# Patient Record
Sex: FEMALE | Race: WHITE | NOT HISPANIC OR LATINO | Employment: FULL TIME | URBAN - METROPOLITAN AREA
[De-identification: names, ages, dates, MRNs, and addresses within clinical notes are randomized per-mention and may not be internally consistent; named-entity substitution may affect disease eponyms.]

---

## 2023-10-31 ENCOUNTER — PATIENT MESSAGE (OUTPATIENT)
Dept: PRIMARY CARE | Facility: CLINIC | Age: 63
End: 2023-10-31
Payer: COMMERCIAL

## 2023-10-31 DIAGNOSIS — Z00.00 ROUTINE GENERAL MEDICAL EXAMINATION AT A HEALTH CARE FACILITY: Primary | ICD-10-CM

## 2023-10-31 DIAGNOSIS — E55.9 VITAMIN D DEFICIENCY: ICD-10-CM

## 2023-12-04 ENCOUNTER — LAB (OUTPATIENT)
Dept: LAB | Facility: LAB | Age: 63
End: 2023-12-04
Payer: COMMERCIAL

## 2023-12-04 DIAGNOSIS — E55.9 VITAMIN D DEFICIENCY: ICD-10-CM

## 2023-12-04 DIAGNOSIS — Z00.00 ROUTINE GENERAL MEDICAL EXAMINATION AT A HEALTH CARE FACILITY: ICD-10-CM

## 2023-12-04 LAB
25(OH)D3 SERPL-MCNC: 35 NG/ML (ref 30–100)
ALBUMIN SERPL BCP-MCNC: 4.2 G/DL (ref 3.4–5)
ALP SERPL-CCNC: 65 U/L (ref 33–136)
ALT SERPL W P-5'-P-CCNC: 13 U/L (ref 7–45)
ANION GAP SERPL CALC-SCNC: 12 MMOL/L (ref 10–20)
AST SERPL W P-5'-P-CCNC: 15 U/L (ref 9–39)
BILIRUB SERPL-MCNC: 0.5 MG/DL (ref 0–1.2)
BUN SERPL-MCNC: 18 MG/DL (ref 6–23)
CALCIUM SERPL-MCNC: 9.6 MG/DL (ref 8.6–10.6)
CHLORIDE SERPL-SCNC: 100 MMOL/L (ref 98–107)
CHOLEST SERPL-MCNC: 208 MG/DL (ref 0–199)
CHOLESTEROL/HDL RATIO: 4.7
CO2 SERPL-SCNC: 29 MMOL/L (ref 21–32)
CREAT SERPL-MCNC: 0.86 MG/DL (ref 0.5–1.05)
ERYTHROCYTE [DISTWIDTH] IN BLOOD BY AUTOMATED COUNT: 13.4 % (ref 11.5–14.5)
EST. AVERAGE GLUCOSE BLD GHB EST-MCNC: 108 MG/DL
GFR SERPL CREATININE-BSD FRML MDRD: 76 ML/MIN/1.73M*2
GLUCOSE SERPL-MCNC: 92 MG/DL (ref 74–99)
HBA1C MFR BLD: 5.4 %
HCT VFR BLD AUTO: 40.8 % (ref 36–46)
HDLC SERPL-MCNC: 44.2 MG/DL
HGB BLD-MCNC: 13.1 G/DL (ref 12–16)
LDLC SERPL CALC-MCNC: 146 MG/DL
MCH RBC QN AUTO: 28.8 PG (ref 26–34)
MCHC RBC AUTO-ENTMCNC: 32.1 G/DL (ref 32–36)
MCV RBC AUTO: 90 FL (ref 80–100)
NON HDL CHOLESTEROL: 164 MG/DL (ref 0–149)
NRBC BLD-RTO: 0 /100 WBCS (ref 0–0)
PLATELET # BLD AUTO: 321 X10*3/UL (ref 150–450)
POTASSIUM SERPL-SCNC: 4.3 MMOL/L (ref 3.5–5.3)
PROT SERPL-MCNC: 7.1 G/DL (ref 6.4–8.2)
RBC # BLD AUTO: 4.55 X10*6/UL (ref 4–5.2)
SODIUM SERPL-SCNC: 137 MMOL/L (ref 136–145)
T4 FREE SERPL-MCNC: 1.18 NG/DL (ref 0.78–1.48)
TRIGL SERPL-MCNC: 91 MG/DL (ref 0–149)
TSH SERPL-ACNC: 4.01 MIU/L (ref 0.44–3.98)
VIT B12 SERPL-MCNC: 422 PG/ML (ref 211–911)
VLDL: 18 MG/DL (ref 0–40)
WBC # BLD AUTO: 7.3 X10*3/UL (ref 4.4–11.3)

## 2023-12-04 PROCEDURE — 84439 ASSAY OF FREE THYROXINE: CPT

## 2023-12-04 PROCEDURE — 82607 VITAMIN B-12: CPT

## 2023-12-04 PROCEDURE — 36415 COLL VENOUS BLD VENIPUNCTURE: CPT

## 2023-12-04 PROCEDURE — 83036 HEMOGLOBIN GLYCOSYLATED A1C: CPT

## 2023-12-04 PROCEDURE — 80053 COMPREHEN METABOLIC PANEL: CPT

## 2023-12-04 PROCEDURE — 84443 ASSAY THYROID STIM HORMONE: CPT

## 2023-12-04 PROCEDURE — 80061 LIPID PANEL: CPT

## 2023-12-04 PROCEDURE — 82306 VITAMIN D 25 HYDROXY: CPT

## 2023-12-04 PROCEDURE — 85027 COMPLETE CBC AUTOMATED: CPT

## 2023-12-07 NOTE — PROGRESS NOTES
"Subjective   Patient ID: Jackie Canela is a 63 y.o. female who presents for Annual Exam.    Last Annual Physical: Nov 2022  Last Dental Visit: Up-to-date   Last Eye exam: Scheduled   Hearing Concerns: No   Diet: Well- balanced   Exercise Routine: Regular walking       HPI   The patient reports that she is taking atenolol-chlorthalidone for her hypertension as prescribed and her blood pressure is well- controlled at this time. She has no side effects from this medication. Blood work showed that the patient's LDL is elevated and she complains of urinary frequency.    Review of Systems  Constitutional: No fever or chills, No Night Sweats  Eyes: No Blurry Vision or Eye sight problems  ENT: No Nasal Discharge, Hoarseness, sore throat  Cardiovascular: no chest pain, no palpitations and no syncope.   Respiratory: no cough, no shortness of breath during exertion and no shortness of breath at rest.   Gastrointestinal: no abdominal pain, no nausea and no vomiting.   : No vaginal discharge, burning with urination, no blood in urine or stools  Skin: No Skin rashes or Lesions  Neuro: No Headache, no dizziness or Numbness or tingling  Psych: No Anxiety, depression or sleeping problems  Heme: No Easy bleeding or brusing.     Objective   /74   Pulse 64   Ht 1.676 m (5' 6\")   Wt 94.3 kg (208 lb)   SpO2 94%   BMI 33.57 kg/m²     Physical Exam  Constitutional: Alert and in no acute distress. Well developed, well nourished.   Head and Face: Head and face: Normal.    Eyes: Normal external exam. Pupils were equal in size, round, reactive to light (PERRL) with normal accommodation and extraocular movements intact (EOMI).   Ears, Nose, Mouth, and Throat: External inspection of ears and nose: Normal.  Hearing: Normal.  Nasal mucosa, septum, and turbinates: Normal.  Lips, teeth, and gums: Normal.  Oropharynx: Normal.   Neck: No neck mass was observed. Supple. Thyroid not enlarged and there were no palpable thyroid nodules. "   Cardiovascular: Heart rate and rhythm were normal, normal S1 and S2. Pedal pulses: Normal. No peripheral edema.   Pulmonary: No respiratory distress. Clear bilateral breath sounds.   Abdomen: Soft nontender; no abdominal mass palpated. Normal bowel sounds. No organomegaly.   Musculoskeletal: No joint swelling seen, normal movements of all extremities. Range of motion: Normal.  Muscle strength/tone: Normal.    Skin: Normal skin color and pigmentation, normal skin turgor, and no rash.   Neurologic: Deep tendon reflexes were 2+ and symmetric.   Psychiatric: Judgment and insight: Intact. Mood and affect: Normal.  Lymphatic: No cervical lymphadenopathy. Palpation of lymph nodes in axillae: Normal.  Palpation of lymph nodes in groin: Normal.    Lab Results   Component Value Date    WBC 7.3 12/04/2023    HGB 13.1 12/04/2023    HCT 40.8 12/04/2023     12/04/2023    CHOL 208 (H) 12/04/2023    TRIG 91 12/04/2023    HDL 44.2 12/04/2023    ALT 13 12/04/2023    AST 15 12/04/2023     12/04/2023    K 4.3 12/04/2023     12/04/2023    CREATININE 0.86 12/04/2023    BUN 18 12/04/2023    CO2 29 12/04/2023    TSH 4.01 (H) 12/04/2023    GLUF 92 07/16/2018    HGBA1C 5.4 12/04/2023       BI mammo bilateral screening tomosynthesis  Narrative: Interpreted By:  BRYAN GONZALES MD  MRN: [id number]  Patient Name: KIM HOWARD     STUDY:  DIGITAL MAMM SCREENING W/ PERNELL;  6/12/2023 7:52 am     ACCESSION NUMBER(S):  [id number]     ORDERING CLINICIAN:  NAHOMI MCDONALD     INDICATION:  Screening. Benign right core biopsy. Strong family history of breast  cancer.     COMPARISON:  06/03/2022, 05/24/2021     FINDINGS:  2D and tomosynthesis images were reviewed at 1 mm slice thickness.     There are areas of scattered fibroglandular tissue.  There are  bilateral benign masses. No suspicious masses or calcifications are  identified.     Impression: No mammographic evidence of malignancy.     BI-RADS CATEGORY:     Category: 2 -  Benign.  Recommendation: 1 Year Screening.     For any future breast imaging appointments, please call [phone]  [phone]).     Patient letter sent SNORM            Assessment/Plan   Diagnoses and all orders for this visit:  Annual physical exam  Primary hypertension  -     atenoloL-chlorthalidone (Tenoretic) 50-25 mg tablet; Take 0.5 tablets by mouth once daily.  Visit for screening mammogram  -     BI mammo bilateral screening tomosynthesis; Future  Moderate mixed hyperlipidemia not requiring statin therapy  -     Lipoprotein a; Future  -     Lipid Panel; Future  Abnormal thyroid exam  -     TSH with reflex to Free T4 if abnormal; Future  Class 1 obesity due to excess calories with serious comorbidity and body mass index (BMI) of 33.0 to 33.9 in adult        Dear Jackie Canela     It was my pleasure to take care of you today in the office. Below are the things we discussed today:    1. 1. Immunizations: Yearly Flu shot is recommended. Up-to-date          a: COVID: Booster up-to-Date         b: Tetanus: Up-to-date         c: Shingrix: Up-to-date        D. Prevnar: Due 2024    2. Blood Work: Reviewed  3. Seen your dentist twice a year  4. Yearly Eye exam is recommended    5. BMI: Obese   6: Diet recommendations:   Eat Clean, Try to have as many home cooked meals as possible  Avoid processed foods which contain excess calories, sugar, and sodium.    7. Exercise recommendations:   150 minutes a week to maintain your weight     If you have to loose weight, you need a better diet and exercise plan.     8. Supplements recommended:  a - Calcium 600 mg up to twice a day to get a total of 1200 mg. Each 8 oz of milk or yogurt or 1 oz of cheese, 1 Banana, 1 serving of green Leafy vegetable has about 300 mg of Calcium, so you may subtract that amount. Calcium citrate is the only acceptable supplement to take if you take an acid suppressing medication like Prilosec; otherwise Calcium carbonate is acceptable too (It can cause  Constipation).   b - Vitamin D - 2000 IU daily     9. Please get your Living will / Advance directive completed if you do not have one already. Please make sure our office has a copy of the latest one.     10. Colonoscopy: Uptodate. Last done in Nov 2019, repeat in Nov 2029  11. Mammogram: Uptodate, ordered for June 2024   12. PAP: Not indicated. S/P hysterectomy   13. Skin Check: Please see Dermatology once a year for a Skin Check.     15. Hypertension: Well- controlled. Continue atenolol- chlorthalidone.    16. Hyperlipidemia: LDL showed 146. Advised the patient to consume a more plant-based diet and limit animal product intake. Lipoprotein A ordered. If lipoprotein A is elevated she will start medication.    17. Abnormal TSH: The patient will recheck levels in 2 months.    18. Urinary frequency: Advised the patient to empty her bladder frequently and try exercises. The patient will let me know if she would like to start medication.    Follow up in one year for a Physical. Please call the office before your Physical to see if you need blood work completed prior to your physical.     Please call me if any questions arise from now until your next visit. I will call you after I am done seeing patients. A Doctor is always available by phone when the office is closed. Please feel free to call for help with any problem that you feel shouldn't wait until the office re-opens.     Scribe Attestation  By signing my name below, IMeenakshi Scribe   attest that this documentation has been prepared under the direction and in the presence of Ana George MD.

## 2023-12-08 ENCOUNTER — OFFICE VISIT (OUTPATIENT)
Dept: PRIMARY CARE | Facility: CLINIC | Age: 63
End: 2023-12-08
Payer: COMMERCIAL

## 2023-12-08 VITALS
WEIGHT: 208 LBS | BODY MASS INDEX: 33.43 KG/M2 | OXYGEN SATURATION: 94 % | HEART RATE: 64 BPM | DIASTOLIC BLOOD PRESSURE: 74 MMHG | SYSTOLIC BLOOD PRESSURE: 120 MMHG | HEIGHT: 66 IN

## 2023-12-08 DIAGNOSIS — E78.2 MODERATE MIXED HYPERLIPIDEMIA NOT REQUIRING STATIN THERAPY: ICD-10-CM

## 2023-12-08 DIAGNOSIS — R94.6 ABNORMAL THYROID EXAM: ICD-10-CM

## 2023-12-08 DIAGNOSIS — E66.09 CLASS 1 OBESITY DUE TO EXCESS CALORIES WITH SERIOUS COMORBIDITY AND BODY MASS INDEX (BMI) OF 33.0 TO 33.9 IN ADULT: ICD-10-CM

## 2023-12-08 DIAGNOSIS — Z12.31 VISIT FOR SCREENING MAMMOGRAM: ICD-10-CM

## 2023-12-08 DIAGNOSIS — I10 PRIMARY HYPERTENSION: ICD-10-CM

## 2023-12-08 DIAGNOSIS — Z00.00 ANNUAL PHYSICAL EXAM: Primary | ICD-10-CM

## 2023-12-08 PROBLEM — L82.1 OTHER SEBORRHEIC KERATOSIS: Status: ACTIVE | Noted: 2023-03-07

## 2023-12-08 PROBLEM — B00.1 RECURRENT COLD SORES: Status: ACTIVE | Noted: 2023-12-08

## 2023-12-08 PROBLEM — M92.61 HAGLUND'S DEFORMITY, RIGHT: Status: ACTIVE | Noted: 2023-12-08

## 2023-12-08 PROBLEM — M17.9 KNEE OSTEOARTHRITIS: Status: ACTIVE | Noted: 2023-12-08

## 2023-12-08 PROBLEM — M76.61 ACHILLES TENDINITIS OF RIGHT LOWER EXTREMITY: Status: ACTIVE | Noted: 2023-12-08

## 2023-12-08 PROBLEM — E04.2 NONTOXIC MULTINODULAR GOITER: Status: ACTIVE | Noted: 2023-12-08

## 2023-12-08 PROBLEM — J30.9 ALLERGIC RHINITIS: Status: ACTIVE | Noted: 2023-12-08

## 2023-12-08 PROCEDURE — 3074F SYST BP LT 130 MM HG: CPT | Performed by: FAMILY MEDICINE

## 2023-12-08 PROCEDURE — 3078F DIAST BP <80 MM HG: CPT | Performed by: FAMILY MEDICINE

## 2023-12-08 PROCEDURE — 1036F TOBACCO NON-USER: CPT | Performed by: FAMILY MEDICINE

## 2023-12-08 PROCEDURE — 3008F BODY MASS INDEX DOCD: CPT | Performed by: FAMILY MEDICINE

## 2023-12-08 PROCEDURE — 99396 PREV VISIT EST AGE 40-64: CPT | Performed by: FAMILY MEDICINE

## 2023-12-08 RX ORDER — IRON BIS-GLYCINAT/VIT C/FA/B12 28-60-0.4
1 CAPSULE ORAL 2 TIMES WEEKLY
COMMUNITY
Start: 2012-07-13

## 2023-12-08 RX ORDER — VIT C/E/ZN/COPPR/LUTEIN/ZEAXAN 250MG-90MG
25 CAPSULE ORAL 2 TIMES WEEKLY
COMMUNITY
Start: 2012-07-13

## 2023-12-08 RX ORDER — ATENOLOL AND CHLORTHALIDONE TABLET 50; 25 MG/1; MG/1
0.5 TABLET ORAL DAILY
COMMUNITY
End: 2023-12-08 | Stop reason: SDUPTHER

## 2023-12-08 RX ORDER — ATENOLOL AND CHLORTHALIDONE TABLET 50; 25 MG/1; MG/1
0.5 TABLET ORAL DAILY
Qty: 45 TABLET | Refills: 3 | Status: SHIPPED | OUTPATIENT
Start: 2023-12-08

## 2023-12-08 ASSESSMENT — PATIENT HEALTH QUESTIONNAIRE - PHQ9
SUM OF ALL RESPONSES TO PHQ9 QUESTIONS 1 AND 2: 0
1. LITTLE INTEREST OR PLEASURE IN DOING THINGS: NOT AT ALL
2. FEELING DOWN, DEPRESSED OR HOPELESS: NOT AT ALL

## 2023-12-08 ASSESSMENT — COLUMBIA-SUICIDE SEVERITY RATING SCALE - C-SSRS
1. IN THE PAST MONTH, HAVE YOU WISHED YOU WERE DEAD OR WISHED YOU COULD GO TO SLEEP AND NOT WAKE UP?: NO
2. HAVE YOU ACTUALLY HAD ANY THOUGHTS OF KILLING YOURSELF?: NO

## 2024-01-05 ENCOUNTER — OFFICE VISIT (OUTPATIENT)
Dept: OPHTHALMOLOGY | Facility: CLINIC | Age: 64
End: 2024-01-05
Payer: COMMERCIAL

## 2024-01-05 DIAGNOSIS — H52.4 PRESBYOPIA: ICD-10-CM

## 2024-01-05 DIAGNOSIS — H35.722 RETINAL PIGMENT EPITHELIAL DETACHMENT OF LEFT EYE: ICD-10-CM

## 2024-01-05 DIAGNOSIS — H35.3121 EARLY DRY STAGE NONEXUDATIVE AGE-RELATED MACULAR DEGENERATION OF LEFT EYE: Primary | ICD-10-CM

## 2024-01-05 DIAGNOSIS — H52.13 MYOPIA, BILATERAL: ICD-10-CM

## 2024-01-05 PROCEDURE — 92134 CPTRZ OPH DX IMG PST SGM RTA: CPT | Mod: BILATERAL PROCEDURE | Performed by: OPTOMETRIST

## 2024-01-05 PROCEDURE — 92014 COMPRE OPH EXAM EST PT 1/>: CPT | Performed by: OPTOMETRIST

## 2024-01-05 PROCEDURE — 92015 DETERMINE REFRACTIVE STATE: CPT | Performed by: OPTOMETRIST

## 2024-01-05 ASSESSMENT — ENCOUNTER SYMPTOMS
EYES NEGATIVE: 0
CONSTITUTIONAL NEGATIVE: 0
RESPIRATORY NEGATIVE: 0
PSYCHIATRIC NEGATIVE: 0
CARDIOVASCULAR NEGATIVE: 0
ENDOCRINE NEGATIVE: 0
ALLERGIC/IMMUNOLOGIC NEGATIVE: 0
NEUROLOGICAL NEGATIVE: 0
GASTROINTESTINAL NEGATIVE: 0
MUSCULOSKELETAL NEGATIVE: 0
HEMATOLOGIC/LYMPHATIC NEGATIVE: 0

## 2024-01-05 ASSESSMENT — REFRACTION_MANIFEST
OD_ADD: +2.50
OD_SPHERE: -2.00
OD_AXIS: 003
OD_CYLINDER: -2.00
OS_AXIS: 023
OD_AXIS: 003
OS_SPHERE: -1.50
OD_CYLINDER: -1.75
OS_SPHERE: -1.50
OD_SPHERE: -2.25
OS_ADD: +2.50
OS_AXIS: 023
OS_CYLINDER: -0.25
OS_CYLINDER: -1.00
METHOD_AUTOREFRACTION: 1

## 2024-01-05 ASSESSMENT — REFRACTION_CURRENTRX
OD_BRAND: BIOFINITY MF TORIC
OS_SPHERE: -1.50
OD_BASECURVE: 8.7
OS_ADD: +2.50D
OS_AXIS: 020
OS_DIAMETER: 14.5
OD_AXIS: 180
OD_DIAMETER: 14.5
OD_ADD: +2.50N
OS_BASECURVE: 8.7
OS_BRAND: BIOFINITY MF TORIC
OS_CYLINDER: -0.75
OD_SPHERE: -2.25
OD_CYLINDER: -1.75

## 2024-01-05 ASSESSMENT — REFRACTION_WEARINGRX
OS_ADD: +2.50
OD_CYLINDER: -2.25
OS_CYLINDER: -1.00
OS_AXIS: 015
OD_AXIS: 010
OD_ADD: +2.50
OD_SPHERE: -2.25
OS_SPHERE: -1.50

## 2024-01-05 ASSESSMENT — CONF VISUAL FIELD
OD_SUPERIOR_TEMPORAL_RESTRICTION: 0
OS_SUPERIOR_NASAL_RESTRICTION: 0
OD_INFERIOR_NASAL_RESTRICTION: 0
OD_SUPERIOR_NASAL_RESTRICTION: 0
OS_INFERIOR_TEMPORAL_RESTRICTION: 0
OD_INFERIOR_TEMPORAL_RESTRICTION: 0
OS_SUPERIOR_TEMPORAL_RESTRICTION: 0
OS_NORMAL: 1
OD_NORMAL: 1
OS_INFERIOR_NASAL_RESTRICTION: 0

## 2024-01-05 ASSESSMENT — VISUAL ACUITY
OD_CC: 20/20
OS_CC: 20/20
CORRECTION_TYPE: GLASSES
METHOD: SNELLEN - LINEAR

## 2024-01-05 ASSESSMENT — SLIT LAMP EXAM - LIDS
COMMENTS: GOOD POSITION
COMMENTS: GOOD POSITION

## 2024-01-05 ASSESSMENT — TONOMETRY
IOP_METHOD: TONOPEN
OS_IOP_MMHG: 17
OD_IOP_MMHG: 14

## 2024-01-05 ASSESSMENT — CUP TO DISC RATIO
OS_RATIO: .2
OD_RATIO: .2

## 2024-01-05 ASSESSMENT — EXTERNAL EXAM - RIGHT EYE: OD_EXAM: NORMAL

## 2024-01-05 ASSESSMENT — EXTERNAL EXAM - LEFT EYE: OS_EXAM: NORMAL

## 2024-01-05 NOTE — Clinical Note
Both eyes (OU) Contact Lens Order  Quantity: 1 Package: TRIAL Appointment needed? Yes Medically necessary? No Ship To: [facility] Additional instructions: order trial Biofinity MF toric Both eyes, return when in

## 2024-01-06 NOTE — PROGRESS NOTES
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     Myopia, bilateral  Presbyopia  Interested in trying CL again, would like multifocal, has not tried Biofinity Toric MF, order trials and return when in    A spectacle prescription was dispensed to be used as needed.

## 2024-02-02 ENCOUNTER — OFFICE VISIT (OUTPATIENT)
Dept: OPHTHALMOLOGY | Facility: CLINIC | Age: 64
End: 2024-02-02
Payer: COMMERCIAL

## 2024-02-02 DIAGNOSIS — H52.4 PRESBYOPIA: ICD-10-CM

## 2024-02-02 DIAGNOSIS — H52.13 MYOPIA, BILATERAL: Primary | ICD-10-CM

## 2024-02-02 PROCEDURE — FLVLG CONTACT LENS EVAL - LEVEL 2 (SP): Performed by: OPTOMETRIST

## 2024-02-02 ASSESSMENT — ENCOUNTER SYMPTOMS
RESPIRATORY NEGATIVE: 0
PSYCHIATRIC NEGATIVE: 0
CARDIOVASCULAR NEGATIVE: 0
NEUROLOGICAL NEGATIVE: 0
ENDOCRINE NEGATIVE: 0
MUSCULOSKELETAL NEGATIVE: 0
CONSTITUTIONAL NEGATIVE: 0
ALLERGIC/IMMUNOLOGIC NEGATIVE: 0
HEMATOLOGIC/LYMPHATIC NEGATIVE: 0
EYES NEGATIVE: 0
GASTROINTESTINAL NEGATIVE: 0

## 2024-02-02 ASSESSMENT — VISUAL ACUITY
OS_CC+: -1
OD_CC: 20/20
VA_OR_OS_CURRENT_RX: 20/20-2
VA_OR_OD_CURRENT_RX: 20/20
METHOD: SNELLEN - LINEAR
OS_CC: 20/20
CORRECTION_TYPE: GLASSES

## 2024-02-02 ASSESSMENT — REFRACTION_CURRENTRX
OD_BASECURVE: 8.7
OD_SPHERE: -2.25
OS_ADD: +2.50D
OD_DIAMETER: 14.5
OS_CYLINDER: -0.75
OS_SPHERE: -1.50
OS_DIAMETER: 14.5
OD_ADD: +2.50N
OD_CYLINDER: -1.75
OS_BASECURVE: 8.7
OS_BRAND: BIOFINITY MF TORIC
OD_BRAND: BIOFINITY MF TORIC
OD_AXIS: 180
OS_AXIS: 020

## 2024-02-02 ASSESSMENT — EXTERNAL EXAM - RIGHT EYE: OD_EXAM: NORMAL

## 2024-02-02 ASSESSMENT — SLIT LAMP EXAM - LIDS
COMMENTS: GOOD POSITION
COMMENTS: GOOD POSITION

## 2024-02-02 ASSESSMENT — EXTERNAL EXAM - LEFT EYE: OS_EXAM: NORMAL

## 2024-02-02 NOTE — PROGRESS NOTES
Assessment/Plan   Diagnoses and all orders for this visit:  Myopia, bilateral  Presbyopia    CL dispensed  Cleaning reviewed  Soft contact lens (SCL) HO given    Modified mono Right eye near    Follow up 3 weeks

## 2024-02-08 ENCOUNTER — OFFICE VISIT (OUTPATIENT)
Dept: OPHTHALMOLOGY | Facility: CLINIC | Age: 64
End: 2024-02-08
Payer: COMMERCIAL

## 2024-02-08 DIAGNOSIS — H35.722 RETINAL PIGMENT EPITHELIAL DETACHMENT OF LEFT EYE: ICD-10-CM

## 2024-02-08 DIAGNOSIS — H35.3132 INTERMEDIATE STAGE NONEXUDATIVE AGE-RELATED MACULAR DEGENERATION OF BOTH EYES: Primary | ICD-10-CM

## 2024-02-08 PROCEDURE — 99214 OFFICE O/P EST MOD 30 MIN: CPT

## 2024-02-08 PROCEDURE — 92134 CPTRZ OPH DX IMG PST SGM RTA: CPT | Mod: BILATERAL PROCEDURE

## 2024-02-08 ASSESSMENT — CONF VISUAL FIELD
OS_NORMAL: 1
OD_INFERIOR_NASAL_RESTRICTION: 0
OD_NORMAL: 1
OD_SUPERIOR_TEMPORAL_RESTRICTION: 0
OD_INFERIOR_TEMPORAL_RESTRICTION: 0
OS_INFERIOR_TEMPORAL_RESTRICTION: 0
OD_SUPERIOR_NASAL_RESTRICTION: 0
OS_SUPERIOR_NASAL_RESTRICTION: 0
OS_SUPERIOR_TEMPORAL_RESTRICTION: 0
OS_INFERIOR_NASAL_RESTRICTION: 0

## 2024-02-08 ASSESSMENT — VISUAL ACUITY
OD_CC: 20/20
METHOD: SNELLEN - LINEAR
CORRECTION_TYPE: GLASSES
OS_CC: 20/20

## 2024-02-08 ASSESSMENT — SLIT LAMP EXAM - LIDS
COMMENTS: GOOD POSITION
COMMENTS: GOOD POSITION

## 2024-02-08 ASSESSMENT — CUP TO DISC RATIO
OS_RATIO: 0.3
OD_RATIO: 0.3

## 2024-02-08 ASSESSMENT — TONOMETRY
OS_IOP_MMHG: 15
OD_IOP_MMHG: 12
IOP_METHOD: GOLDMANN APPLANATION

## 2024-02-08 ASSESSMENT — EXTERNAL EXAM - RIGHT EYE: OD_EXAM: NORMAL

## 2024-02-08 ASSESSMENT — EXTERNAL EXAM - LEFT EYE: OS_EXAM: NORMAL

## 2024-02-08 NOTE — PROGRESS NOTES
Intermediate stage dry age-related macular degeneration of both eyesH35.3123  New patient referred by Dr. Patel  - evidence of large/ soft drusen OU  - no evidence of CNVM or fluid on exam or OCT  - exam is consistent with intermediate dry    - OCT 02/08/24 : multiple medium to large drusen, no IRF or SRF, both eyes (OU)   - - -   #Plan#:   1. AREDS 2 BID    2. Recommend UV protection, diet modification (eat green leafy vegetables, fish, lower meat consumption)  3. Amsler grid follow up  4. Observe  5. RTC in 6 months       Myopia, bilateral  Presbyopia   - managed by Dr. Patel

## 2024-03-01 ENCOUNTER — OFFICE VISIT (OUTPATIENT)
Dept: OPHTHALMOLOGY | Facility: CLINIC | Age: 64
End: 2024-03-01
Payer: COMMERCIAL

## 2024-03-01 DIAGNOSIS — H52.13 MYOPIA, BILATERAL: Primary | ICD-10-CM

## 2024-03-01 DIAGNOSIS — H52.4 PRESBYOPIA: ICD-10-CM

## 2024-03-01 ASSESSMENT — CONF VISUAL FIELD
OD_SUPERIOR_TEMPORAL_RESTRICTION: 0
OD_NORMAL: 1
OS_INFERIOR_NASAL_RESTRICTION: 0
OD_INFERIOR_NASAL_RESTRICTION: 0
OD_INFERIOR_TEMPORAL_RESTRICTION: 0
METHOD: COUNTING FINGERS
OS_NORMAL: 1
OS_SUPERIOR_TEMPORAL_RESTRICTION: 0
OS_SUPERIOR_NASAL_RESTRICTION: 0
OD_SUPERIOR_NASAL_RESTRICTION: 0
OS_INFERIOR_TEMPORAL_RESTRICTION: 0

## 2024-03-01 ASSESSMENT — REFRACTION_CURRENTRX
OS_ADD: +2.50D
OS_BRAND: BIOFINITY MF TORIC
OD_AXIS: 180
OD_ADD: +2.50N
OD_SPHERE: -1.75
OD_SPHERE: -2.25
OD_CYLINDER: -1.75
OD_ADD: +2.50N
OS_AXIS: 020
OD_CYLINDER: -1.75
OD_BRAND: BIOFINITY MF TORIC
OS_AXIS: 020
OS_BASECURVE: 8.7
OD_AXIS: 180
OD_BASECURVE: 8.7
OS_SPHERE: -1.50
OD_DIAMETER: 14.5
OS_ADD: +2.50D
OD_BRAND: BIOFINITY MF TORIC
OS_DIAMETER: 14.5
OS_BASECURVE: 8.7
OS_DIAMETER: 14.5
OD_DIAMETER: 14.5
OS_CYLINDER: -0.75
OD_BASECURVE: 8.7
OS_CYLINDER: -0.75
OS_BRAND: BIOFINITY MF TORIC
OS_SPHERE: -1.50

## 2024-03-01 ASSESSMENT — ENCOUNTER SYMPTOMS
CONSTITUTIONAL NEGATIVE: 0
RESPIRATORY NEGATIVE: 0
PSYCHIATRIC NEGATIVE: 0
MUSCULOSKELETAL NEGATIVE: 0
ALLERGIC/IMMUNOLOGIC NEGATIVE: 0
GASTROINTESTINAL NEGATIVE: 0
CARDIOVASCULAR NEGATIVE: 0
ENDOCRINE NEGATIVE: 0
EYES NEGATIVE: 1
NEUROLOGICAL NEGATIVE: 0
HEMATOLOGIC/LYMPHATIC NEGATIVE: 0

## 2024-03-01 ASSESSMENT — VISUAL ACUITY
OS_CC: 20/20
OD_CC: 20/40
METHOD: SNELLEN - LINEAR

## 2024-03-01 ASSESSMENT — SLIT LAMP EXAM - LIDS
COMMENTS: GOOD POSITION
COMMENTS: GOOD POSITION

## 2024-03-01 ASSESSMENT — EXTERNAL EXAM - LEFT EYE: OS_EXAM: NORMAL

## 2024-03-01 ASSESSMENT — EXTERNAL EXAM - RIGHT EYE: OD_EXAM: NORMAL

## 2024-03-01 NOTE — Clinical Note
Both eyes (OU) Contact Lens Order  Quantity: 1 Package: TRIAL Appointment needed? No Medically necessary? No Ship To: Home Additional instructions: second trial pair Mail to patient

## 2024-03-04 ENCOUNTER — LAB (OUTPATIENT)
Dept: LAB | Facility: LAB | Age: 64
End: 2024-03-04
Payer: COMMERCIAL

## 2024-03-04 DIAGNOSIS — E78.2 MODERATE MIXED HYPERLIPIDEMIA NOT REQUIRING STATIN THERAPY: ICD-10-CM

## 2024-03-04 DIAGNOSIS — R94.6 ABNORMAL THYROID EXAM: ICD-10-CM

## 2024-03-04 LAB
CHOLEST SERPL-MCNC: 191 MG/DL (ref 0–199)
CHOLESTEROL/HDL RATIO: 4.8
HDLC SERPL-MCNC: 39.9 MG/DL
LDLC SERPL CALC-MCNC: 131 MG/DL
NON HDL CHOLESTEROL: 151 MG/DL (ref 0–149)
TRIGL SERPL-MCNC: 99 MG/DL (ref 0–149)
TSH SERPL-ACNC: 3.39 MIU/L (ref 0.44–3.98)
VLDL: 20 MG/DL (ref 0–40)

## 2024-03-04 PROCEDURE — 80061 LIPID PANEL: CPT

## 2024-03-04 PROCEDURE — 84443 ASSAY THYROID STIM HORMONE: CPT

## 2024-03-04 PROCEDURE — 82172 ASSAY OF APOLIPOPROTEIN: CPT

## 2024-03-04 PROCEDURE — 36415 COLL VENOUS BLD VENIPUNCTURE: CPT

## 2024-03-06 LAB — LPA SERPL-MCNC: 7 MG/DL

## 2024-03-21 ENCOUNTER — TELEPHONE (OUTPATIENT)
Dept: OPHTHALMOLOGY | Facility: CLINIC | Age: 64
End: 2024-03-21
Payer: COMMERCIAL

## 2024-06-12 ENCOUNTER — APPOINTMENT (OUTPATIENT)
Dept: RADIOLOGY | Facility: CLINIC | Age: 64
End: 2024-06-12
Payer: COMMERCIAL

## 2024-06-17 ENCOUNTER — APPOINTMENT (OUTPATIENT)
Dept: RADIOLOGY | Facility: CLINIC | Age: 64
End: 2024-06-17
Payer: COMMERCIAL

## 2024-06-17 ENCOUNTER — HOSPITAL ENCOUNTER (OUTPATIENT)
Dept: RADIOLOGY | Facility: CLINIC | Age: 64
Discharge: HOME | End: 2024-06-17
Payer: COMMERCIAL

## 2024-06-17 VITALS — HEIGHT: 66 IN | BODY MASS INDEX: 32.62 KG/M2 | WEIGHT: 203 LBS

## 2024-06-17 DIAGNOSIS — Z12.31 VISIT FOR SCREENING MAMMOGRAM: ICD-10-CM

## 2024-06-17 PROCEDURE — 77067 SCR MAMMO BI INCL CAD: CPT

## 2024-06-17 PROCEDURE — 77067 SCR MAMMO BI INCL CAD: CPT | Performed by: RADIOLOGY

## 2024-06-17 PROCEDURE — 77063 BREAST TOMOSYNTHESIS BI: CPT | Performed by: RADIOLOGY

## 2024-06-28 ENCOUNTER — APPOINTMENT (OUTPATIENT)
Dept: OPHTHALMOLOGY | Facility: CLINIC | Age: 64
End: 2024-06-28
Payer: COMMERCIAL

## 2024-06-28 DIAGNOSIS — H35.3121 EARLY DRY STAGE NONEXUDATIVE AGE-RELATED MACULAR DEGENERATION OF LEFT EYE: Primary | ICD-10-CM

## 2024-06-28 DIAGNOSIS — H43.811 PVD (POSTERIOR VITREOUS DETACHMENT), RIGHT EYE: ICD-10-CM

## 2024-06-28 PROCEDURE — 99213 OFFICE O/P EST LOW 20 MIN: CPT

## 2024-06-28 PROCEDURE — 92134 CPTRZ OPH DX IMG PST SGM RTA: CPT

## 2024-06-28 ASSESSMENT — SLIT LAMP EXAM - LIDS
COMMENTS: GOOD POSITION
COMMENTS: GOOD POSITION

## 2024-06-28 ASSESSMENT — ENCOUNTER SYMPTOMS
HEMATOLOGIC/LYMPHATIC NEGATIVE: 0
ENDOCRINE NEGATIVE: 0
NEUROLOGICAL NEGATIVE: 0
MUSCULOSKELETAL NEGATIVE: 0
ALLERGIC/IMMUNOLOGIC NEGATIVE: 0
CONSTITUTIONAL NEGATIVE: 0
RESPIRATORY NEGATIVE: 0
PSYCHIATRIC NEGATIVE: 0
EYES NEGATIVE: 1
CARDIOVASCULAR NEGATIVE: 0
GASTROINTESTINAL NEGATIVE: 0

## 2024-06-28 ASSESSMENT — CONF VISUAL FIELD
OS_SUPERIOR_TEMPORAL_RESTRICTION: 0
OS_NORMAL: 1
OD_SUPERIOR_TEMPORAL_RESTRICTION: 0
OD_INFERIOR_NASAL_RESTRICTION: 0
OS_INFERIOR_NASAL_RESTRICTION: 0
OD_INFERIOR_TEMPORAL_RESTRICTION: 0
OD_SUPERIOR_NASAL_RESTRICTION: 0
OD_NORMAL: 1
OS_INFERIOR_TEMPORAL_RESTRICTION: 0
OS_SUPERIOR_NASAL_RESTRICTION: 0

## 2024-06-28 ASSESSMENT — CUP TO DISC RATIO
OD_RATIO: 0.3
OS_RATIO: 0.3

## 2024-06-28 ASSESSMENT — EXTERNAL EXAM - LEFT EYE: OS_EXAM: NORMAL

## 2024-06-28 ASSESSMENT — TONOMETRY
OS_IOP_MMHG: 15
OD_IOP_MMHG: 14
IOP_METHOD: GOLDMANN APPLANATION

## 2024-06-28 ASSESSMENT — VISUAL ACUITY
OS_CC: 20/20
OD_CC: 20/20
METHOD: SNELLEN - LINEAR

## 2024-06-28 ASSESSMENT — EXTERNAL EXAM - RIGHT EYE: OD_EXAM: NORMAL

## 2024-06-28 NOTE — PROGRESS NOTES
Intermediate stage dry age-related macular degeneration of both eyesH35.3123  New patient referred by Dr. Patel  - evidence of large/ soft drusen OU  - no evidence of CNVM or fluid on exam or OCT  - exam is consistent with intermediate dry    - OCT 06/28/24 : multiple medium to large drusen, no IRF or SRF, both eyes (OU)   - - -   #Plan#:   1. AREDS 2 BID    2. Recommend UV protection, diet modification (eat green leafy vegetables, fish, lower meat consumption)  3. Amsler grid follow up  4. Observe  5. RTC in 6 months     Posterior vitreous detachment (PVD), right eyeH43.811  no retinal breaks, holes or retinal detachment    Plan  Discussed symptoms of retinal breaks and detachment.   Patient educated that retinal detachment can lead to permanent vision loss. Advised to call immediately if the patient experience any of these symptoms.  Observe  RTC in 6 weeks     Myopia, bilateral  Presbyopia   - managed by Dr. Patel

## 2024-07-24 ENCOUNTER — TELEMEDICINE (OUTPATIENT)
Dept: PRIMARY CARE | Facility: CLINIC | Age: 64
End: 2024-07-24
Payer: COMMERCIAL

## 2024-07-24 ENCOUNTER — TELEPHONE (OUTPATIENT)
Dept: PRIMARY CARE | Facility: CLINIC | Age: 64
End: 2024-07-24

## 2024-07-24 DIAGNOSIS — J01.00 ACUTE NON-RECURRENT MAXILLARY SINUSITIS: Primary | ICD-10-CM

## 2024-07-24 DIAGNOSIS — R06.2 WHEEZING: Primary | ICD-10-CM

## 2024-07-24 PROCEDURE — 99213 OFFICE O/P EST LOW 20 MIN: CPT | Performed by: FAMILY MEDICINE

## 2024-07-24 PROCEDURE — 1036F TOBACCO NON-USER: CPT | Performed by: FAMILY MEDICINE

## 2024-07-24 RX ORDER — METHYLPREDNISOLONE 4 MG/1
TABLET ORAL
Qty: 21 TABLET | Refills: 0 | Status: SHIPPED | OUTPATIENT
Start: 2024-07-24 | End: 2024-07-31

## 2024-07-24 RX ORDER — ALBUTEROL SULFATE 90 UG/1
2 AEROSOL, METERED RESPIRATORY (INHALATION) EVERY 4 HOURS PRN
Qty: 8.5 G | Refills: 0 | Status: SHIPPED | OUTPATIENT
Start: 2024-07-24

## 2024-07-24 NOTE — PROGRESS NOTES
Subjective   Patient ID: Jackie Canela is a 63 y.o. female who presents for URI.    HPI   The patient states that her symptoms started 4 days ago with wheezing at night. She denies having any fever or shortness-of-breath. She has a cough which started 4 days ago and has been worsening. It started as a dry cough and is worse in the evening, she is coughing up a yellow coloured mucous. The patient denies having any history of asthma.    Review of Systems:  Constitutional: No fever or chills  Cardiovascular: no chest pain, no palpitations and no syncope.   Respiratory: + cough and wheezing. no shortness of breath during exertion and no shortness of breath at rest.   Gastrointestinal: no abdominal pain, no nausea and no vomiting.  Neuro: No Headache, no dizziness    Physical Exam:   Constitutional: Alert and in no acute distress. Well developed, well nourished.   Head and Face: Head and face: Normal.     Eyes: Normal external exam.    Ears, Nose, Mouth, and Throat: External inspection of ears and nose: Normal.  Hearing: Normal.   Neck: No neck mass was observed. Supple.  Pulmonary: No respiratory distress.    Musculoskeletal: Range of motion: Normal.     Skin: Normal skin color and pigmentation, normal skin turgor, and no rash.    Neurologic: Coordination: Normal.    Psychiatric: Judgment and insight: Intact. Mood and affect: Normal.    Lab Results   Component Value Date    WBC 7.3 12/04/2023    HGB 13.1 12/04/2023    HCT 40.8 12/04/2023     12/04/2023    CHOL 191 03/04/2024    TRIG 99 03/04/2024    HDL 39.9 03/04/2024    ALT 13 12/04/2023    AST 15 12/04/2023     12/04/2023    K 4.3 12/04/2023     12/04/2023    CREATININE 0.86 12/04/2023    BUN 18 12/04/2023    CO2 29 12/04/2023    TSH 3.39 03/04/2024    GLUF 92 07/16/2018    HGBA1C 5.4 12/04/2023       OCT, Retina - OU - Both Eyes  Retinal multimodal imaging including photography was completed, and the   findings are described in the  examination.      Assessment/Plan   Diagnoses and all orders for this visit:  Wheezing  -     methylPREDNISolone (Medrol Dospak) 4 mg tablets; Take as directed on package.  -     albuterol (ProAir HFA) 90 mcg/actuation inhaler; Inhale 2 puffs every 4 hours if needed for wheezing or shortness of breath.      1. Wheezing: Start Medrol Dospak and use Albuterol as needed. Advised her to let me know if there is no improvement in 5 days.    This Medical recommendation has been made based on the Telephonic/Video conversation and the history is given by the patient, which I as a Physician and the patient also understand that there are limitations given the lack of an in-person Physical Exam. Patient will call back if symptoms don't improve.    Your yearly Physical is due in: Dec 2024    When you call the office for your yearly Physical, please ask them to inform me to order your blood work, so that you can get the fasting blood work before your appointment and we can discuss the results at your physical.      A Doctor is always available by phone when the office is closed. Please feel free to call for help with any problem that you feel shouldn't wait until the office re-opens.     Scribe Attestation  By signing my name below, IMeenakshi Scribe   attest that this documentation has been prepared under the direction and in the presence of Ana George MD.

## 2024-07-31 RX ORDER — DOXYCYCLINE 100 MG/1
100 CAPSULE ORAL 2 TIMES DAILY
Qty: 14 CAPSULE | Refills: 0 | Status: SHIPPED | OUTPATIENT
Start: 2024-07-31 | End: 2024-08-07

## 2024-08-08 ENCOUNTER — APPOINTMENT (OUTPATIENT)
Dept: OPHTHALMOLOGY | Facility: CLINIC | Age: 64
End: 2024-08-08
Payer: COMMERCIAL

## 2024-09-13 ENCOUNTER — TELEPHONE (OUTPATIENT)
Dept: OPHTHALMOLOGY | Facility: HOSPITAL | Age: 64
End: 2024-09-13
Payer: COMMERCIAL

## 2024-09-13 NOTE — TELEPHONE ENCOUNTER
Pt called asking about [employer] brand vision vitamin supplement vs. PreserVision. Explained that any AREDS2 formulation has evidence basis for her dry macular degeneration, but other formulations do not have the same standard of evidence. All questions answered.

## 2024-09-20 ENCOUNTER — APPOINTMENT (OUTPATIENT)
Dept: DERMATOLOGY | Facility: CLINIC | Age: 64
End: 2024-09-20
Payer: COMMERCIAL

## 2024-09-20 DIAGNOSIS — L57.8 ACTINIC SKIN DAMAGE: ICD-10-CM

## 2024-09-20 DIAGNOSIS — L81.4 LENTIGO: ICD-10-CM

## 2024-09-20 DIAGNOSIS — D22.9 MULTIPLE BENIGN NEVI: Primary | ICD-10-CM

## 2024-09-20 DIAGNOSIS — D23.9 DERMATOFIBROMA: ICD-10-CM

## 2024-09-20 DIAGNOSIS — Z12.83 SCREENING EXAM FOR SKIN CANCER: ICD-10-CM

## 2024-09-20 DIAGNOSIS — L82.1 SEBORRHEIC KERATOSIS: ICD-10-CM

## 2024-09-20 PROCEDURE — 1157F ADVNC CARE PLAN IN RCRD: CPT | Performed by: DERMATOLOGY

## 2024-09-20 PROCEDURE — 1036F TOBACCO NON-USER: CPT | Performed by: DERMATOLOGY

## 2024-09-20 PROCEDURE — 1159F MED LIST DOCD IN RCRD: CPT | Performed by: DERMATOLOGY

## 2024-09-20 PROCEDURE — 1160F RVW MEDS BY RX/DR IN RCRD: CPT | Performed by: DERMATOLOGY

## 2024-09-20 PROCEDURE — 99213 OFFICE O/P EST LOW 20 MIN: CPT | Performed by: DERMATOLOGY

## 2024-09-20 ASSESSMENT — DERMATOLOGY PATIENT ASSESSMENT
DO YOU HAVE IRREGULAR MENSTRUAL CYCLES: NO
ARE YOU ON BIRTH CONTROL: NO
ARE YOU TRYING TO GET PREGNANT: NO
HAVE YOU HAD OR DO YOU HAVE A STAPH INFECTION: NO
HAVE YOU HAD OR DO YOU HAVE VASCULAR DISEASE: NO
DO YOU USE SUNSCREEN: OCCASIONALLY
ARE YOU AN ORGAN TRANSPLANT RECIPIENT: NO
DO YOU USE A TANNING BED: NO
DO YOU HAVE ANY NEW OR CHANGING LESIONS: YES

## 2024-09-20 ASSESSMENT — DERMATOLOGY QUALITY OF LIFE (QOL) ASSESSMENT
RATE HOW EMOTIONALLY BOTHERED YOU ARE BY YOUR SKIN PROBLEM (FOR EXAMPLE, WORRY, EMBARRASSMENT, FRUSTRATION): 0 - NEVER BOTHERED
RATE HOW BOTHERED YOU ARE BY EFFECTS OF YOUR SKIN PROBLEMS ON YOUR ACTIVITIES (EG, GOING OUT, ACCOMPLISHING WHAT YOU WANT, WORK ACTIVITIES OR YOUR RELATIONSHIPS WITH OTHERS): 0 - NEVER BOTHERED
DATE THE QUALITY-OF-LIFE ASSESSMENT WAS COMPLETED: 67103
ARE THERE EXCLUSIONS OR EXCEPTIONS FOR THE QUALITY OF LIFE ASSESSMENT: NO
RATE HOW BOTHERED YOU ARE BY SYMPTOMS OF YOUR SKIN PROBLEM (EG, ITCHING, STINGING BURNING, HURTING OR SKIN IRRITATION): 0 - NEVER BOTHERED

## 2024-09-20 ASSESSMENT — ITCH NUMERIC RATING SCALE: HOW SEVERE IS YOUR ITCHING?: 0

## 2024-09-20 ASSESSMENT — PATIENT GLOBAL ASSESSMENT (PGA): PATIENT GLOBAL ASSESSMENT: PATIENT GLOBAL ASSESSMENT:  1 - CLEAR

## 2024-09-20 NOTE — PROGRESS NOTES
Subjective     Jackie Canela is a 64 y.o. female who presents for the following: Skin Check (Back and bilat shoulders). Last derm visit 3/7/23 for spot check dermatofibroma on right calf    Review of Systems:  No other skin or systemic complaints other than what is documented elsewhere in the note.    The following portions of the chart were reviewed this encounter and updated as appropriate:  Tobacco  Allergies  Meds  Problems  Med Hx  Surg Hx         Skin Cancer History  No skin cancer on file.      Specialty Problems          Dermatology Problems    Other seborrheic keratosis        Objective   Well appearing patient in no apparent distress; mood and affect are within normal limits.    A full examination was performed including scalp, head, eyes, ears, nose, lips, neck, chest, axillae, abdomen, back, buttocks, bilateral upper extremities, bilateral lower extremities, hands, feet, fingers, toes, fingernails, and toenails. All findings within normal limits unless otherwise noted below.    Assessment/Plan   1. Multiple benign nevi  Brown and tan macules and papules with reassuring findings on dermoscopy    -These lesions have benign, reassuring patterns on dermoscopy  -Recommend continued self observation, and to contact the office if any changes in nevi are noticed    2. Lentigo  Tan macules    -Benign appearing on exam  -Reassurance, recommend observation    3. Seborrheic keratosis  Stuck on, waxy macule(s)/papule(s)/plaque(s) with comedo-like openings and milia like cysts    -Discussed the nature of the diagnosis  -Reassurance, recommend continued observation    4. Actinic skin damage  Background of photodamage with hyper- and hypo-pigmented macules on the skin    5. Screening exam for skin cancer  As part of a routine Full Skin Exam, a genital examination with the presence of a chaperone was offered. The patient declined the exam and declined the chaperone.       Full body skin exam  -No lesions  concerning for malignancy on the remainder the skin exam today   - The ugly duckling sign was discussed. Monitor for any skin lesions that are different in color, shape, or size than others on body  -Sun protection was discussed. Recommend SPF 30+, hats with brims, sun protective clothing, and avoiding sun exposure between 10 AM and 2 PM whenever possible  -Recommend regular skin exams or sooner if new or changing lesions       6. Dermatofibroma (2)  Right Lower Leg - Anterior, Right Upper Arm - Posterior  Firm hyperpigmented papule with positive dimple sign. On dermoscopy, there is a central scar-like area with a uniform peripheral pigment network.    -Discussed nature of condition  -Reassurance, recommend observation  -Recommend that the patient avoid trauma/injury/manipulation to the area to avoid the lesion enlarging  -Recommend the patient return for re-evaluation if the lesion enlarges, becomes painful or symptomatic, or is changing        Follow up 1-2 years Full Skin Exam

## 2024-09-26 ENCOUNTER — PATIENT MESSAGE (OUTPATIENT)
Dept: PRIMARY CARE | Facility: CLINIC | Age: 64
End: 2024-09-26
Payer: COMMERCIAL

## 2024-09-26 DIAGNOSIS — N95.8 OTHER SPECIFIED MENOPAUSAL AND PERIMENOPAUSAL DISORDERS: ICD-10-CM

## 2024-09-26 DIAGNOSIS — E55.9 VITAMIN D DEFICIENCY: ICD-10-CM

## 2024-09-26 DIAGNOSIS — R94.6 ABNORMAL THYROID EXAM: ICD-10-CM

## 2024-09-26 DIAGNOSIS — I10 PRIMARY HYPERTENSION: Primary | ICD-10-CM

## 2024-09-26 DIAGNOSIS — R73.9 ELEVATED BLOOD SUGAR: ICD-10-CM

## 2024-10-03 ENCOUNTER — HOSPITAL ENCOUNTER (OUTPATIENT)
Dept: RADIOLOGY | Facility: CLINIC | Age: 64
Discharge: HOME | End: 2024-10-03
Payer: COMMERCIAL

## 2024-10-03 DIAGNOSIS — N95.8 OTHER SPECIFIED MENOPAUSAL AND PERIMENOPAUSAL DISORDERS: ICD-10-CM

## 2024-10-03 PROCEDURE — 77080 DXA BONE DENSITY AXIAL: CPT

## 2024-10-03 PROCEDURE — 77080 DXA BONE DENSITY AXIAL: CPT | Performed by: RADIOLOGY

## 2024-12-02 ENCOUNTER — LAB (OUTPATIENT)
Dept: LAB | Facility: LAB | Age: 64
End: 2024-12-02
Payer: COMMERCIAL

## 2024-12-02 DIAGNOSIS — E55.9 VITAMIN D DEFICIENCY: ICD-10-CM

## 2024-12-02 DIAGNOSIS — I10 PRIMARY HYPERTENSION: ICD-10-CM

## 2024-12-02 DIAGNOSIS — R94.6 ABNORMAL THYROID EXAM: ICD-10-CM

## 2024-12-02 DIAGNOSIS — R73.9 ELEVATED BLOOD SUGAR: ICD-10-CM

## 2024-12-02 LAB
25(OH)D3 SERPL-MCNC: 50 NG/ML (ref 30–100)
ALBUMIN SERPL BCP-MCNC: 4.3 G/DL (ref 3.4–5)
ALP SERPL-CCNC: 73 U/L (ref 33–136)
ALT SERPL W P-5'-P-CCNC: 14 U/L (ref 7–45)
ANION GAP SERPL CALC-SCNC: 12 MMOL/L (ref 10–20)
AST SERPL W P-5'-P-CCNC: 15 U/L (ref 9–39)
BILIRUB SERPL-MCNC: 0.5 MG/DL (ref 0–1.2)
BUN SERPL-MCNC: 14 MG/DL (ref 6–23)
CALCIUM SERPL-MCNC: 9.7 MG/DL (ref 8.6–10.6)
CHLORIDE SERPL-SCNC: 101 MMOL/L (ref 98–107)
CHOLEST SERPL-MCNC: 204 MG/DL (ref 0–199)
CHOLESTEROL/HDL RATIO: 4.8
CO2 SERPL-SCNC: 31 MMOL/L (ref 21–32)
CREAT SERPL-MCNC: 0.69 MG/DL (ref 0.5–1.05)
CREAT UR-MCNC: 85.1 MG/DL (ref 20–320)
EGFRCR SERPLBLD CKD-EPI 2021: >90 ML/MIN/1.73M*2
ERYTHROCYTE [DISTWIDTH] IN BLOOD BY AUTOMATED COUNT: 13.5 % (ref 11.5–14.5)
EST. AVERAGE GLUCOSE BLD GHB EST-MCNC: 108 MG/DL
GLUCOSE SERPL-MCNC: 80 MG/DL (ref 74–99)
HBA1C MFR BLD: 5.4 %
HCT VFR BLD AUTO: 39.2 % (ref 36–46)
HDLC SERPL-MCNC: 42.1 MG/DL
HGB BLD-MCNC: 12.2 G/DL (ref 12–16)
LDLC SERPL CALC-MCNC: 139 MG/DL
MCH RBC QN AUTO: 27.9 PG (ref 26–34)
MCHC RBC AUTO-ENTMCNC: 31.1 G/DL (ref 32–36)
MCV RBC AUTO: 90 FL (ref 80–100)
MICROALBUMIN UR-MCNC: <7 MG/L
MICROALBUMIN/CREAT UR: NORMAL MG/G{CREAT}
NON HDL CHOLESTEROL: 162 MG/DL (ref 0–149)
NRBC BLD-RTO: 0 /100 WBCS (ref 0–0)
PLATELET # BLD AUTO: 326 X10*3/UL (ref 150–450)
POTASSIUM SERPL-SCNC: 4.1 MMOL/L (ref 3.5–5.3)
PROT SERPL-MCNC: 7.3 G/DL (ref 6.4–8.2)
RBC # BLD AUTO: 4.37 X10*6/UL (ref 4–5.2)
SODIUM SERPL-SCNC: 140 MMOL/L (ref 136–145)
TRIGL SERPL-MCNC: 113 MG/DL (ref 0–149)
TSH SERPL-ACNC: 2.63 MIU/L (ref 0.44–3.98)
VIT B12 SERPL-MCNC: 451 PG/ML (ref 211–911)
VLDL: 23 MG/DL (ref 0–40)
WBC # BLD AUTO: 7.1 X10*3/UL (ref 4.4–11.3)

## 2024-12-02 PROCEDURE — 80053 COMPREHEN METABOLIC PANEL: CPT

## 2024-12-02 PROCEDURE — 85027 COMPLETE CBC AUTOMATED: CPT

## 2024-12-02 PROCEDURE — 83036 HEMOGLOBIN GLYCOSYLATED A1C: CPT

## 2024-12-02 PROCEDURE — 82306 VITAMIN D 25 HYDROXY: CPT

## 2024-12-02 PROCEDURE — 82570 ASSAY OF URINE CREATININE: CPT

## 2024-12-02 PROCEDURE — 36415 COLL VENOUS BLD VENIPUNCTURE: CPT

## 2024-12-02 PROCEDURE — 80061 LIPID PANEL: CPT

## 2024-12-02 PROCEDURE — 84443 ASSAY THYROID STIM HORMONE: CPT

## 2024-12-02 PROCEDURE — 82043 UR ALBUMIN QUANTITATIVE: CPT

## 2024-12-02 PROCEDURE — 82607 VITAMIN B-12: CPT

## 2024-12-10 ENCOUNTER — APPOINTMENT (OUTPATIENT)
Dept: PRIMARY CARE | Facility: CLINIC | Age: 64
End: 2024-12-10
Payer: COMMERCIAL

## 2024-12-10 VITALS
SYSTOLIC BLOOD PRESSURE: 131 MMHG | HEART RATE: 62 BPM | DIASTOLIC BLOOD PRESSURE: 84 MMHG | WEIGHT: 193.2 LBS | HEIGHT: 66 IN | OXYGEN SATURATION: 97 % | BODY MASS INDEX: 31.05 KG/M2

## 2024-12-10 DIAGNOSIS — Z00.00 WELCOME TO MEDICARE PREVENTIVE VISIT: Primary | ICD-10-CM

## 2024-12-10 DIAGNOSIS — E55.9 VITAMIN D DEFICIENCY: ICD-10-CM

## 2024-12-10 DIAGNOSIS — E66.09 CLASS 1 OBESITY DUE TO EXCESS CALORIES WITH SERIOUS COMORBIDITY AND BODY MASS INDEX (BMI) OF 31.0 TO 31.9 IN ADULT: ICD-10-CM

## 2024-12-10 DIAGNOSIS — I10 PRIMARY HYPERTENSION: ICD-10-CM

## 2024-12-10 DIAGNOSIS — Z12.31 ENCOUNTER FOR SCREENING MAMMOGRAM FOR BREAST CANCER: ICD-10-CM

## 2024-12-10 DIAGNOSIS — E78.00 PURE HYPERCHOLESTEROLEMIA: ICD-10-CM

## 2024-12-10 DIAGNOSIS — R73.9 ELEVATED BLOOD SUGAR: ICD-10-CM

## 2024-12-10 DIAGNOSIS — Z23 NEED FOR VACCINATION: ICD-10-CM

## 2024-12-10 DIAGNOSIS — R94.6 ABNORMAL THYROID EXAM: ICD-10-CM

## 2024-12-10 DIAGNOSIS — E66.811 CLASS 1 OBESITY DUE TO EXCESS CALORIES WITH SERIOUS COMORBIDITY AND BODY MASS INDEX (BMI) OF 31.0 TO 31.9 IN ADULT: ICD-10-CM

## 2024-12-10 PROCEDURE — 90677 PCV20 VACCINE IM: CPT | Performed by: FAMILY MEDICINE

## 2024-12-10 PROCEDURE — 3008F BODY MASS INDEX DOCD: CPT | Performed by: FAMILY MEDICINE

## 2024-12-10 PROCEDURE — 99213 OFFICE O/P EST LOW 20 MIN: CPT | Performed by: FAMILY MEDICINE

## 2024-12-10 PROCEDURE — 3079F DIAST BP 80-89 MM HG: CPT | Performed by: FAMILY MEDICINE

## 2024-12-10 PROCEDURE — 1170F FXNL STATUS ASSESSED: CPT | Performed by: FAMILY MEDICINE

## 2024-12-10 PROCEDURE — 1157F ADVNC CARE PLAN IN RCRD: CPT | Performed by: FAMILY MEDICINE

## 2024-12-10 PROCEDURE — 1159F MED LIST DOCD IN RCRD: CPT | Performed by: FAMILY MEDICINE

## 2024-12-10 PROCEDURE — 3075F SYST BP GE 130 - 139MM HG: CPT | Performed by: FAMILY MEDICINE

## 2024-12-10 PROCEDURE — 1036F TOBACCO NON-USER: CPT | Performed by: FAMILY MEDICINE

## 2024-12-10 PROCEDURE — 1160F RVW MEDS BY RX/DR IN RCRD: CPT | Performed by: FAMILY MEDICINE

## 2024-12-10 PROCEDURE — G0402 INITIAL PREVENTIVE EXAM: HCPCS | Performed by: FAMILY MEDICINE

## 2024-12-10 PROCEDURE — G0009 ADMIN PNEUMOCOCCAL VACCINE: HCPCS | Performed by: FAMILY MEDICINE

## 2024-12-10 PROCEDURE — 99397 PER PM REEVAL EST PAT 65+ YR: CPT | Performed by: FAMILY MEDICINE

## 2024-12-10 PROCEDURE — 1123F ACP DISCUSS/DSCN MKR DOCD: CPT | Performed by: FAMILY MEDICINE

## 2024-12-10 RX ORDER — ROSUVASTATIN CALCIUM 5 MG/1
5 TABLET, COATED ORAL DAILY
Qty: 90 TABLET | Refills: 3 | Status: SHIPPED | OUTPATIENT
Start: 2024-12-10

## 2024-12-10 RX ORDER — ATENOLOL AND CHLORTHALIDONE TABLET 50; 25 MG/1; MG/1
0.5 TABLET ORAL DAILY
Qty: 45 TABLET | Refills: 3 | Status: SHIPPED | OUTPATIENT
Start: 2024-12-10

## 2024-12-10 ASSESSMENT — VISUAL ACUITY
OD_CC: 20/20
OS_CC: 20/20

## 2024-12-10 ASSESSMENT — ACTIVITIES OF DAILY LIVING (ADL)
BATHING: INDEPENDENT
DOING_HOUSEWORK: INDEPENDENT
DRESSING: INDEPENDENT
GROCERY_SHOPPING: INDEPENDENT
TAKING_MEDICATION: INDEPENDENT
MANAGING_FINANCES: INDEPENDENT

## 2024-12-10 ASSESSMENT — COLUMBIA-SUICIDE SEVERITY RATING SCALE - C-SSRS
2. HAVE YOU ACTUALLY HAD ANY THOUGHTS OF KILLING YOURSELF?: NO
1. IN THE PAST MONTH, HAVE YOU WISHED YOU WERE DEAD OR WISHED YOU COULD GO TO SLEEP AND NOT WAKE UP?: NO

## 2024-12-10 NOTE — PROGRESS NOTES
"Subjective   Patient ID: Jackie Canela is a 64 y.o. female who presents for Welcome To Medicare.    HPI the patient has been doing well and denies any significant complaints.  She states that she understands her cholesterol is high and she has a family history of hyperlipidemia.  She is open to going on medication.  She notices some numbness in her right arm occasionally in the fingers and hand.  She also has some right-sided pain on the lateral hip.    Review of Systems  Constitutional: No fever or chills, No Night Sweats  Eyes: No Blurry Vision or Eye sight problems  ENT: No Nasal Discharge, Hoarseness, sore throat  Cardiovascular: no chest pain, no palpitations and no syncope.   Respiratory: no cough, no shortness of breath during exertion and no shortness of breath at rest.   Gastrointestinal: no abdominal pain, no nausea and no vomiting.   : No vaginal discharge, burning with urination, no blood in urine or stools  Skin: No Skin rashes or Lesions  Neuro: No Headache, no dizziness or Numbness or tingling  Psych: No Anxiety, depression or sleeping problems  Heme: No Easy bleeding or brusing.     Objective   /84 (BP Location: Left arm, Patient Position: Sitting, BP Cuff Size: Large adult)   Pulse 62   Ht 1.676 m (5' 6\")   Wt 87.6 kg (193 lb 3.2 oz)   LMP  (LMP Unknown)   SpO2 97%   BMI 31.18 kg/m²     Physical Exam  Patient declined Chaperone  Constitutional: Alert and in no acute distress. Well developed, well nourished.   Head and Face: Head and face: Normal.    Eyes: Normal external exam. Pupils were equal in size, round, reactive to light (PERRL) with normal accommodation and extraocular movements intact (EOMI).   Ears, Nose, Mouth, and Throat: External inspection of ears and nose: Normal.  Hearing: Normal.  Nasal mucosa, septum, and turbinates: Normal.  Lips, teeth, and gums: Normal.  Oropharynx: Normal.   Neck: No neck mass was observed. Supple. Thyroid not enlarged and there were no palpable " thyroid nodules.   Cardiovascular: Heart rate and rhythm were normal, normal S1 and S2. Pedal pulses: Normal. No peripheral edema.   Pulmonary: No respiratory distress. Clear bilateral breath sounds.   Breast: Normal Appearance, No Masses or lumps palpated  Abdomen: Soft nontender; no abdominal mass palpated. Normal bowel sounds. No organomegaly.   : Deferred   Musculoskeletal: No joint swelling seen, normal movements of all extremities. Range of motion: Normal.  Muscle strength/tone: Normal.    Skin: Normal skin color and pigmentation, normal skin turgor, and no rash.   Neurologic: Deep tendon reflexes were 2+ and symmetric.   Psychiatric: Judgment and insight: Intact. Mood and affect: Normal.  Lymphatic: No cervical lymphadenopathy. Palpation of lymph nodes in axillae: Normal.  Palpation of lymph nodes in groin: Normal.    Lab Results   Component Value Date    WBC 7.1 12/02/2024    HGB 12.2 12/02/2024    HCT 39.2 12/02/2024     12/02/2024    CHOL 204 (H) 12/02/2024    TRIG 113 12/02/2024    HDL 42.1 12/02/2024    ALT 14 12/02/2024    AST 15 12/02/2024     12/02/2024    K 4.1 12/02/2024     12/02/2024    CREATININE 0.69 12/02/2024    BUN 14 12/02/2024    CO2 31 12/02/2024    TSH 2.63 12/02/2024    GLUF 92 07/16/2018    HGBA1C 5.4 12/02/2024       XR DEXA bone density  Narrative: Interpreted By:  Pao Shrestha,   STUDY:  DEXA BONE [id number]/3/2024 2:55 pm      INDICATION:  Signs/Symptoms:screening. The patient is a 63 y/o  year old F.      ,N95.8 Other specified menopausal and perimenopausal disorders      COMPARISON:  None.      ACCESSION NUMBER(S):  [id number]      ORDERING CLINICIAN:  NAHOMI MCDONALD      TECHNIQUE:  DEXA BONE DENSITY      FINDINGS:  SPINE L1-L4  Bone Mineral Density: 1.231  T-Score 0.4  Z-Score 2.0          LEFT FEMUR -TOTAL  Bone Mineral Density: 0.965  T-Score -0.3   Z-Score  0.8          LEFT FEMUR -NECK  Bone Mineral Density: 0.881  T-Score -1.1  Z-Score 0.3               World Health Organization (WHO) criteria for post-menopausal,   Women:  Normal:         T-score at or above -1 SD  Osteopenia:   T-score between -1 and -2.5 SD  Osteoporosis: T-score at or below -2.5 SD          10-year Fracture Risk:  Major Osteoporotic Fracture  7.7  Hip Fracture                        0.6          This exam was performed at [facility] on a GE Lunar  [employer] Advance Dexa Unit.      Impression: DEXA:  According to World Health Organization criteria,  classification is low bone mass (osteopenia)      Followup recommended in two years or sooner as clinically warranted.      All images and detailed analysis are available on the  Radiology  PACS.      MACRO:  None      Signed by: Pao Shrestha 10/3/2024 3:11 PM  Dictation workstation:   [id number]      Assessment/Plan   Diagnoses and all orders for this visit:  Welcome to Medicare preventive visit  Primary hypertension  -     1 Year Follow Up In [facility] - PCP - Wellness Exam; Future  -     atenoloL-chlorthalidone (Tenoretic) 50-25 mg tablet; Take 0.5 tablets by mouth once daily.  -     CBC; Future  -     Albumin-Creatinine Ratio, Urine Random; Future  -     Comprehensive Metabolic Panel; Future  Class 1 obesity due to excess calories with serious comorbidity and body mass index (BMI) of 31.0 to 31.9 in adult  Need for vaccination  -     Pneumococcal vaccine 20-valent  Encounter for screening mammogram for breast cancer  -     BI mammo bilateral screening tomosynthesis; Future  Pure hypercholesterolemia  -     Lipid Panel; Future  -     Comprehensive Metabolic Panel; Future  -     rosuvastatin (Crestor) 5 mg tablet; Take 1 tablet (5 mg) by mouth once daily.  -     Lipid Panel; Future  Abnormal thyroid exam  -     TSH with reflex to Free T4 if abnormal; Future  Elevated blood sugar  -     Hemoglobin A1C; Future  Vitamin D deficiency  -     Vitamin B12; Future  -     Vitamin D 25-Hydroxy,Total (for eval of Vitamin  D levels); Future        Dear Jackie Canela     It was my pleasure to take care of you today in the office. Below are the things we discussed today:    1. 1. Immunizations: Yearly Flu shot is recommended.          a: COVID: Up-to-Date         b: Tetanus: Up-to-date         c: Shingrix: Up-to-date         d: Prevnar: Given today    2. Blood Work: Reviewed  3. Seen your dentist twice a year  4. Yearly Eye exam is recommended    5. BMI: Obese  6: Diet recommendations:   Eat Clean, Try to have as many home cooked meals as possible  Avoid processed foods which contain excess calories, sugar, and sodium.    7. Exercise recommendations:   150 minutes a week to maintain your weight     If you have to loose weight, you need a better diet and exercise plan.     8. Supplements recommended:  a - Calcium 600 mg up to twice a day to get a total of 1200 mg. Each 8 oz of milk or yogurt or 1 oz of cheese, 1 Banana, 1 serving of green Leafy vegetable has about 300 mg of Calcium, so you may subtract that amount. Calcium citrate is the only acceptable supplement to take if you take an acid suppressing medication like Prilosec; otherwise Calcium carbonate is acceptable too (It can cause Constipation).   b - Vitamin D - 2000 IU daily     9. Please get your Living will / Advance directive completed if you do not have one already. Please make sure our office has a copy of the latest one.     10. Colonoscopy: Uptodate, repeat in Nov 2029  11. Mammogram: Uptodate, ordered for June 2025  12. PAP: Not indicated  13. DEXA: Up-to-date  14: Skin Check: Please see Dermatology once a year for a Skin Check.     15.  Pretension, continue atenolol and chlorthalidone    16.  Hyperlipidemia, start rosuvastatin 5 mg and let me know if you have any side effects.  Recheck blood work in 3 months.    17.  Right hip greater trochanteric bursitis.  Advised patient to try ice and if the pain is not better can consider seeing Ortho for injections.    18.  Right  wrist tingling.  Advised patient to wear wrist brace at night and if not better to let us know    Follow up in one year for a Physical. Please call the office before your Physical to see if you need blood work completed prior to your physical.     Please call me if any questions arise from now until your next visit. I will call you after I am done seeing patients. A Doctor is always available by phone when the office is closed. Please feel free to call for help with any problem that you feel shouldn't wait until the office re-opens.     Ana George MD

## 2024-12-31 ENCOUNTER — TELEPHONE (OUTPATIENT)
Dept: DERMATOLOGY | Facility: CLINIC | Age: 64
End: 2024-12-31
Payer: COMMERCIAL

## 2025-01-03 ENCOUNTER — APPOINTMENT (OUTPATIENT)
Dept: OPHTHALMOLOGY | Facility: CLINIC | Age: 65
End: 2025-01-03
Payer: COMMERCIAL

## 2025-01-03 DIAGNOSIS — H35.3132 INTERMEDIATE STAGE DRY AGE-RELATED MACULAR DEGENERATION OF BOTH EYES: ICD-10-CM

## 2025-01-03 DIAGNOSIS — H43.811 PVD (POSTERIOR VITREOUS DETACHMENT), RIGHT EYE: Primary | ICD-10-CM

## 2025-01-03 PROCEDURE — 92134 CPTRZ OPH DX IMG PST SGM RTA: CPT

## 2025-01-03 PROCEDURE — 99213 OFFICE O/P EST LOW 20 MIN: CPT

## 2025-01-03 ASSESSMENT — CUP TO DISC RATIO
OS_RATIO: 0.3
OD_RATIO: 0.3

## 2025-01-03 ASSESSMENT — VISUAL ACUITY
CORRECTION_TYPE: GLASSES
OS_CC+: -1
OD_CC: 20/20
OS_CC: 20/20
METHOD: SNELLEN - LINEAR

## 2025-01-03 ASSESSMENT — EXTERNAL EXAM - LEFT EYE: OS_EXAM: NORMAL

## 2025-01-03 ASSESSMENT — ENCOUNTER SYMPTOMS
HEMATOLOGIC/LYMPHATIC NEGATIVE: 0
RESPIRATORY NEGATIVE: 0
EYES NEGATIVE: 1
CARDIOVASCULAR NEGATIVE: 0
ALLERGIC/IMMUNOLOGIC NEGATIVE: 0
CONSTITUTIONAL NEGATIVE: 0
PSYCHIATRIC NEGATIVE: 0
MUSCULOSKELETAL NEGATIVE: 0
NEUROLOGICAL NEGATIVE: 0
GASTROINTESTINAL NEGATIVE: 0
ENDOCRINE NEGATIVE: 0

## 2025-01-03 ASSESSMENT — TONOMETRY
OD_IOP_MMHG: 15
OS_IOP_MMHG: 16
IOP_METHOD: TONOPEN

## 2025-01-03 ASSESSMENT — EXTERNAL EXAM - RIGHT EYE: OD_EXAM: NORMAL

## 2025-01-03 ASSESSMENT — SLIT LAMP EXAM - LIDS
COMMENTS: GOOD POSITION
COMMENTS: GOOD POSITION

## 2025-01-03 NOTE — PROGRESS NOTES
Intermediate stage dry age-related macular degeneration of both eyesH35.3123  Follow up visit for AMD  - evidence of large/ soft drusen OU  - no evidence of CNVM or fluid on exam or OCT  - exam is consistent with intermediate dry    - OCT 01/03/25 : multiple medium to large drusen, no IRF or SRF, both eyes (OU)   - - -   #Plan#:   1. AREDS 2 BID    2. Recommend UV protection, diet modification (eat green leafy vegetables, fish, lower meat consumption)  3. Amsler grid follow up  4. Observe  5. RTC in 6 months     Posterior vitreous detachment (PVD), right eyeH43.811  no retinal breaks, holes or retinal detachment    Plan  Discussed symptoms of retinal breaks and detachment.   Patient educated that retinal detachment can lead to permanent vision loss. Advised to call immediately if the patient experience any of these symptoms.  Observe    Myopia, bilateral  Presbyopia   - managed by Dr. Patel

## 2025-01-04 ENCOUNTER — APPOINTMENT (OUTPATIENT)
Dept: DERMATOLOGY | Facility: CLINIC | Age: 65
End: 2025-01-04
Payer: MEDICARE

## 2025-01-08 ENCOUNTER — APPOINTMENT (OUTPATIENT)
Dept: DERMATOLOGY | Facility: CLINIC | Age: 65
End: 2025-01-08
Payer: MEDICARE

## 2025-01-08 DIAGNOSIS — L82.0 INFLAMED SEBORRHEIC KERATOSIS: Primary | ICD-10-CM

## 2025-01-08 PROCEDURE — 1123F ACP DISCUSS/DSCN MKR DOCD: CPT | Performed by: DERMATOLOGY

## 2025-01-08 PROCEDURE — 1159F MED LIST DOCD IN RCRD: CPT | Performed by: DERMATOLOGY

## 2025-01-08 PROCEDURE — 1160F RVW MEDS BY RX/DR IN RCRD: CPT | Performed by: DERMATOLOGY

## 2025-01-08 PROCEDURE — 1157F ADVNC CARE PLAN IN RCRD: CPT | Performed by: DERMATOLOGY

## 2025-01-08 PROCEDURE — 17110 DESTRUCTION B9 LES UP TO 14: CPT | Performed by: DERMATOLOGY

## 2025-01-08 PROCEDURE — 1036F TOBACCO NON-USER: CPT | Performed by: DERMATOLOGY

## 2025-01-08 ASSESSMENT — DERMATOLOGY QUALITY OF LIFE (QOL) ASSESSMENT
RATE HOW EMOTIONALLY BOTHERED YOU ARE BY YOUR SKIN PROBLEM (FOR EXAMPLE, WORRY, EMBARRASSMENT, FRUSTRATION): 0 - NEVER BOTHERED
ARE THERE EXCLUSIONS OR EXCEPTIONS FOR THE QUALITY OF LIFE ASSESSMENT: NO
RATE HOW BOTHERED YOU ARE BY SYMPTOMS OF YOUR SKIN PROBLEM (EG, ITCHING, STINGING BURNING, HURTING OR SKIN IRRITATION): 0 - NEVER BOTHERED
RATE HOW BOTHERED YOU ARE BY EFFECTS OF YOUR SKIN PROBLEMS ON YOUR ACTIVITIES (EG, GOING OUT, ACCOMPLISHING WHAT YOU WANT, WORK ACTIVITIES OR YOUR RELATIONSHIPS WITH OTHERS): 0 - NEVER BOTHERED
DATE THE QUALITY-OF-LIFE ASSESSMENT WAS COMPLETED: 67213

## 2025-01-08 ASSESSMENT — DERMATOLOGY PATIENT ASSESSMENT
ARE YOU TRYING TO GET PREGNANT: NO
HAVE YOU HAD OR DO YOU HAVE VASCULAR DISEASE: NO
HAVE YOU HAD OR DO YOU HAVE A STAPH INFECTION: NO
DO YOU HAVE ANY NEW OR CHANGING LESIONS: YES
DO YOU HAVE IRREGULAR MENSTRUAL CYCLES: NO
DO YOU USE A TANNING BED: NO
ARE YOU AN ORGAN TRANSPLANT RECIPIENT: NO
DO YOU USE SUNSCREEN: OCCASIONALLY
WHERE ARE THESE NEW OR CHANGING LESIONS LOCATED: RT SHOULDER
ARE YOU ON BIRTH CONTROL: NO

## 2025-01-08 ASSESSMENT — PATIENT GLOBAL ASSESSMENT (PGA): PATIENT GLOBAL ASSESSMENT: PATIENT GLOBAL ASSESSMENT:  1 - CLEAR

## 2025-01-08 ASSESSMENT — ITCH NUMERIC RATING SCALE: HOW SEVERE IS YOUR ITCHING?: 0

## 2025-01-08 NOTE — PROGRESS NOTES
Subjective     Jackie Canela is a 64 y.o. female who presents for the following: Suspicious Skin Lesion (Rt shoulder). Last derm visit 9/20/24 for Full Skin Exam. No hx of skin cancer.    She noticed this at home and sent mychart photo 12/25/24. Since then it has changed more and become crusty    Review of Systems:  No other skin or systemic complaints other than what is documented elsewhere in the note.    The following portions of the chart were reviewed this encounter and updated as appropriate:   Tobacco  Allergies  Meds  Problems  Med Hx  Surg Hx         Skin Cancer History  No skin cancer on file.      Specialty Problems          Dermatology Problems    Other seborrheic keratosis        Objective   Well appearing patient in no apparent distress; mood and affect are within normal limits.    A focused skin examination was performed. All findings within normal limits unless otherwise noted below.    Assessment/Plan   1. Inflamed seborrheic keratosis  Right Shoulder - Posterior  Stuck-on, waxy macule(s)/papule(s)/plaque(s) with comedo-like openings and milia-like cysts with surrounding erythema and crusting    -Patient requests cryotherapy today for these clinically inflamed lesions  -Possible side effects of liquid nitrogen treatment reviewed including formation of blisters, crusting, tenderness, scar, and discoloration which may be permanent.    Destr of lesion - Right Shoulder - Posterior  Complexity: simple    Destruction method: cryotherapy    Informed consent: discussed and consent obtained    Lesion destroyed using liquid nitrogen: Yes    Outcome: patient tolerated procedure well with no complications          Follow up for routine Full Skin Exam 9/2025 as scheduled

## 2025-01-10 ENCOUNTER — APPOINTMENT (OUTPATIENT)
Dept: OPHTHALMOLOGY | Facility: CLINIC | Age: 65
End: 2025-01-10
Payer: COMMERCIAL

## 2025-01-10 DIAGNOSIS — H52.13 MYOPIA, BILATERAL: Primary | ICD-10-CM

## 2025-01-10 PROCEDURE — 92012 INTRM OPH EXAM EST PATIENT: CPT | Performed by: OPTOMETRIST

## 2025-01-10 PROCEDURE — 92015 DETERMINE REFRACTIVE STATE: CPT | Performed by: OPTOMETRIST

## 2025-01-10 ASSESSMENT — REFRACTION_MANIFEST
OD_SPHERE: -1.75
OS_CYLINDER: -0.25
OD_AXIS: 007
OS_AXIS: 155
OS_SPHERE: -1.25
METHOD_AUTOREFRACTION: 1
OS_ADD: +2.50
OD_AXIS: 175
OD_SPHERE: -2.00
OD_ADD: +2.50
OD_CYLINDER: -1.50
OS_CYLINDER: -1.00
OD_CYLINDER: -1.75
OS_AXIS: 024
OS_SPHERE: -1.25

## 2025-01-10 ASSESSMENT — REFRACTION_WEARINGRX
OD_SPHERE: -2.25
OD_ADD: +2.50
OS_CYLINDER: -1.00
OD_CYLINDER: -2.00
OS_AXIS: 023
OS_SPHERE: -1.50
OD_AXIS: 003
OS_ADD: +2.50

## 2025-01-10 ASSESSMENT — CUP TO DISC RATIO
OD_RATIO: 0.3
OS_RATIO: 0.3

## 2025-01-10 ASSESSMENT — CONF VISUAL FIELD
OD_INFERIOR_NASAL_RESTRICTION: 0
OS_INFERIOR_NASAL_RESTRICTION: 0
METHOD: COUNTING FINGERS
OS_SUPERIOR_TEMPORAL_RESTRICTION: 0
OS_SUPERIOR_NASAL_RESTRICTION: 0
OD_INFERIOR_TEMPORAL_RESTRICTION: 0
OS_NORMAL: 1
OD_SUPERIOR_TEMPORAL_RESTRICTION: 0
OD_NORMAL: 1
OD_SUPERIOR_NASAL_RESTRICTION: 0
OS_INFERIOR_TEMPORAL_RESTRICTION: 0

## 2025-01-10 ASSESSMENT — ENCOUNTER SYMPTOMS
ENDOCRINE NEGATIVE: 0
PSYCHIATRIC NEGATIVE: 0
GASTROINTESTINAL NEGATIVE: 0
NEUROLOGICAL NEGATIVE: 0
EYES NEGATIVE: 0
CARDIOVASCULAR NEGATIVE: 1
HEMATOLOGIC/LYMPHATIC NEGATIVE: 0
CONSTITUTIONAL NEGATIVE: 0
ALLERGIC/IMMUNOLOGIC NEGATIVE: 0
RESPIRATORY NEGATIVE: 0
MUSCULOSKELETAL NEGATIVE: 0

## 2025-01-10 ASSESSMENT — REFRACTION_CURRENTRX
OD_AXIS: 180
OD_BRAND: BIOFINITY MF TORIC
OS_CYLINDER: -0.75
OD_SPHERE: -1.75
OD_BASECURVE: 8.7
OS_ADD: +2.50D
OS_AXIS: 020
OS_BASECURVE: 8.7
OD_CYLINDER: -1.75
OS_SPHERE: -1.50
OD_DIAMETER: 14.5
OS_DIAMETER: 14.5
OD_ADD: +2.50N
OS_BRAND: BIOFINITY MF TORIC

## 2025-01-10 ASSESSMENT — EXTERNAL EXAM - LEFT EYE: OS_EXAM: NORMAL

## 2025-01-10 ASSESSMENT — SLIT LAMP EXAM - LIDS
COMMENTS: GOOD POSITION
COMMENTS: GOOD POSITION

## 2025-01-10 ASSESSMENT — TONOMETRY
IOP_METHOD: GOLDMANN APPLANATION
OS_IOP_MMHG: 13
OD_IOP_MMHG: 13

## 2025-01-10 ASSESSMENT — VISUAL ACUITY
CORRECTION_TYPE: GLASSES
OD_CC: 20/20
OS_CC: 20/20
METHOD: SNELLEN - LINEAR

## 2025-01-10 ASSESSMENT — EXTERNAL EXAM - RIGHT EYE: OD_EXAM: NORMAL

## 2025-01-10 NOTE — PROGRESS NOTES
Assessment/Plan   Diagnoses and all orders for this visit:  Myopia, bilateral    A spectacle prescription was dispensed to be used as needed.    Follow up with Dr Joyce as scheduled for retinal drusen    No Cls

## 2025-03-31 LAB
ALBUMIN SERPL-MCNC: 4.3 G/DL (ref 3.6–5.1)
ALP SERPL-CCNC: 62 U/L (ref 37–153)
ALT SERPL-CCNC: 16 U/L (ref 6–29)
ANION GAP SERPL CALCULATED.4IONS-SCNC: 10 MMOL/L (CALC) (ref 7–17)
AST SERPL-CCNC: 16 U/L (ref 10–35)
BILIRUB SERPL-MCNC: 0.4 MG/DL (ref 0.2–1.2)
BUN SERPL-MCNC: 21 MG/DL (ref 7–25)
CALCIUM SERPL-MCNC: 9.7 MG/DL (ref 8.6–10.4)
CHLORIDE SERPL-SCNC: 101 MMOL/L (ref 98–110)
CHOLEST SERPL-MCNC: 128 MG/DL
CHOLEST/HDLC SERPL: 2.8 (CALC)
CO2 SERPL-SCNC: 28 MMOL/L (ref 20–32)
CREAT SERPL-MCNC: 0.77 MG/DL (ref 0.5–1.05)
EGFRCR SERPLBLD CKD-EPI 2021: 86 ML/MIN/1.73M2
GLUCOSE SERPL-MCNC: 93 MG/DL (ref 65–99)
HDLC SERPL-MCNC: 45 MG/DL
LDLC SERPL CALC-MCNC: 66 MG/DL (CALC)
NONHDLC SERPL-MCNC: 83 MG/DL (CALC)
POTASSIUM SERPL-SCNC: 4 MMOL/L (ref 3.5–5.3)
PROT SERPL-MCNC: 7 G/DL (ref 6.1–8.1)
SODIUM SERPL-SCNC: 139 MMOL/L (ref 135–146)
TRIGL SERPL-MCNC: 88 MG/DL

## 2025-04-09 ENCOUNTER — APPOINTMENT (OUTPATIENT)
Dept: DERMATOLOGY | Facility: CLINIC | Age: 65
End: 2025-04-09
Payer: COMMERCIAL

## 2025-05-22 ENCOUNTER — OFFICE VISIT (OUTPATIENT)
Dept: PODIATRY | Facility: CLINIC | Age: 65
End: 2025-05-22
Payer: MEDICARE

## 2025-05-22 ENCOUNTER — HOSPITAL ENCOUNTER (OUTPATIENT)
Dept: RADIOLOGY | Facility: CLINIC | Age: 65
Discharge: HOME | End: 2025-05-22
Payer: MEDICARE

## 2025-05-22 DIAGNOSIS — M79.672 FOOT PAIN, BILATERAL: ICD-10-CM

## 2025-05-22 DIAGNOSIS — M79.671 FOOT PAIN, BILATERAL: ICD-10-CM

## 2025-05-22 DIAGNOSIS — M67.471 GANGLION CYST OF RIGHT FOOT: Primary | ICD-10-CM

## 2025-05-22 DIAGNOSIS — M19.071 OSTEOARTHRITIS OF ANKLE AND FOOT, RIGHT: ICD-10-CM

## 2025-05-22 DIAGNOSIS — M19.072 OSTEOARTHRITIS OF ANKLE AND FOOT, LEFT: ICD-10-CM

## 2025-05-22 DIAGNOSIS — G57.92 NEURITIS OF LEFT FOOT: ICD-10-CM

## 2025-05-22 PROCEDURE — 1036F TOBACCO NON-USER: CPT | Performed by: PODIATRIST

## 2025-05-22 PROCEDURE — 1159F MED LIST DOCD IN RCRD: CPT | Performed by: PODIATRIST

## 2025-05-22 PROCEDURE — 1160F RVW MEDS BY RX/DR IN RCRD: CPT | Performed by: PODIATRIST

## 2025-05-22 PROCEDURE — 73630 X-RAY EXAM OF FOOT: CPT | Mod: 50

## 2025-05-22 PROCEDURE — 99204 OFFICE O/P NEW MOD 45 MIN: CPT | Performed by: PODIATRIST

## 2025-05-22 PROCEDURE — 99214 OFFICE O/P EST MOD 30 MIN: CPT | Performed by: PODIATRIST

## 2025-05-22 NOTE — PROGRESS NOTES
Chief Complaint   Patient presents with    Foot Pain     BUMP ON RT TOP FOOT, PAIN IN BOTH FEET   HPI: Patient is complaining about painful dorsal right foot.  This started in mid March.  It did improve by mid April.  Patient states that the bump changes in size.  She does get irritation from shoes.  She is try to switch shoe gear to a mesh style shoe.  Patient is also complaining of pain on the plantar left heel.  Patient points to the area of pain as the posterior plantar skin junction on the lateral heel.  She states this pain happens first thing in the morning and resolves with weightbearing activity.  No trauma.  PMH, PSx, Medications and allergies reviewed.  ROS negative except for what is stated in HPI.    Physical Exam  Patient alert, oriented, no acute distress    VASC: +2/4 pedal pulses B/L.  CFT brisk all digits.  Skin temperature is warm to warm proximal to distal B/L.  (+)hair growth B/L.   No edema noted B/L.    NEURO: Vibratory intact B/L.  Light touch intact B/L.     DERM: no pedal hair growth, varicosities noted BL  No erythema, no ecchymosis, no ulcerations noted B/L    MUSCULOSKEL: +5/5 muscle strength B/L.  Full 1st MPJ and ankle joint ROM B/L.  Firm palpable soft tissue cyst dorsal right foot in the area of Lisfranc's joint measuring 2 cm x 2 cm with pain upon palpation  No reproducible palpable pain plantar left heel.  No pain with lateral compression of calcaneus left    Assessment and Plan  #1  Ganglion cyst right foot  Discussed pathology and treatment options  Reviewed x-rays in detail.  Right foot x-ray as read by me positive for inferior calcaneal spur and DJD of the midfoot with bone spurring noted.  There is no fractures no bone cyst noted.  Recommend OTC Voltaren gel  Discussed proper supportive shoe gear and skipping a lace  Dispensed aperture padding  Follow-up 1 month    #2  Osteoarthritis bilateral foot  Rx: X-rays bilateral foot  Discussed x-rays in detail.  Left foot x-ray as  read by me does show an inferior calcaneal spur and DJD of the midfoot with bone spurring noted.  No fractures noted.  Discussed proper supportive shoe gear  OTC Voltaren gel  Follow-up 1 month    #3  Neuritis left heel  Reviewed findings with patient  Patient instructed on nonweightbearing range of motion exercises prior to weightbearing in the morning  Follow-up as needed

## 2025-06-26 ENCOUNTER — OFFICE VISIT (OUTPATIENT)
Dept: PODIATRY | Facility: CLINIC | Age: 65
End: 2025-06-26
Payer: MEDICARE

## 2025-06-26 DIAGNOSIS — M67.471 GANGLION CYST OF RIGHT FOOT: Primary | ICD-10-CM

## 2025-06-26 DIAGNOSIS — M79.671 FOOT PAIN, BILATERAL: ICD-10-CM

## 2025-06-26 DIAGNOSIS — M19.071 OSTEOARTHRITIS OF ANKLE AND FOOT, RIGHT: ICD-10-CM

## 2025-06-26 DIAGNOSIS — M79.672 FOOT PAIN, BILATERAL: ICD-10-CM

## 2025-06-26 DIAGNOSIS — G57.92 NEURITIS OF LEFT FOOT: ICD-10-CM

## 2025-06-26 DIAGNOSIS — M19.072 OSTEOARTHRITIS OF ANKLE AND FOOT, LEFT: ICD-10-CM

## 2025-06-26 PROCEDURE — 1159F MED LIST DOCD IN RCRD: CPT | Performed by: PODIATRIST

## 2025-06-26 PROCEDURE — 99212 OFFICE O/P EST SF 10 MIN: CPT | Performed by: PODIATRIST

## 2025-06-26 PROCEDURE — 1036F TOBACCO NON-USER: CPT | Performed by: PODIATRIST

## 2025-06-26 PROCEDURE — 1160F RVW MEDS BY RX/DR IN RCRD: CPT | Performed by: PODIATRIST

## 2025-06-26 NOTE — PROGRESS NOTES
"  Chief Complaint   Patient presents with    Follow-up     GANGLION CYST   HPI: Follow up multiple complaints:  L heel pain improved with stretching, neuritis resolved  No pain over ganglion cyst, does not need aperture pad  OTC voltaren works \"very well\" for DJD pain.  Using now once q day in the AM.  \"Works better than advil.\"  No new pedal complaints.      Physical Exam  Patient alert, oriented, no acute distress    VASC: +2/4 pedal pulses B/L.  CFT brisk all digits.  Skin temperature is warm to warm proximal to distal B/L.  (+)hair growth B/L.   No edema noted B/L.    NEURO: Vibratory intact B/L.  Light touch intact B/L.     DERM: no pedal hair growth, varicosities noted BL  No erythema, no ecchymosis, no ulcerations noted B/L    MUSCULOSKEL: +5/5 muscle strength B/L.  Full 1st MPJ and ankle joint ROM B/L.  Firm palpable soft tissue cyst dorsal right foot in the area of Lisfranc's joint measuring 2 cm x 2 cm with no pain upon palpation  No reproducible palpable pain plantar left heel.  No pain with lateral compression of calcaneus left  No reproducible palpable pain B/L foot    Assessment and Plan  #1  Ganglion cyst right foot  Continue to monitor  Continue in supportive shoes  Voltaren gel as needed  Follow-up as needed    #2  Osteoarthritis bilateral foot  Controlled with Voltaren gel  Continue with Voltaren gel and supportive shoes  Follow-up as needed    #3  Neuritis left heel  Resolved  Continue with stretching, supportive shoes and Voltaren gel as needed  Follow-up as needed        "

## 2025-07-07 ENCOUNTER — HOSPITAL ENCOUNTER (OUTPATIENT)
Dept: RADIOLOGY | Facility: CLINIC | Age: 65
Discharge: HOME | End: 2025-07-07
Payer: MEDICARE

## 2025-07-07 DIAGNOSIS — Z12.31 ENCOUNTER FOR SCREENING MAMMOGRAM FOR BREAST CANCER: ICD-10-CM

## 2025-07-07 PROCEDURE — 77067 SCR MAMMO BI INCL CAD: CPT | Performed by: RADIOLOGY

## 2025-07-07 PROCEDURE — 77067 SCR MAMMO BI INCL CAD: CPT

## 2025-07-07 PROCEDURE — 77063 BREAST TOMOSYNTHESIS BI: CPT | Performed by: RADIOLOGY

## 2025-07-10 ENCOUNTER — TELEPHONE (OUTPATIENT)
Dept: PRIMARY CARE | Facility: CLINIC | Age: 65
End: 2025-07-10

## 2025-07-10 ENCOUNTER — APPOINTMENT (OUTPATIENT)
Dept: OPHTHALMOLOGY | Facility: CLINIC | Age: 65
End: 2025-07-10
Payer: MEDICARE

## 2025-07-10 DIAGNOSIS — H35.722 RETINAL PIGMENT EPITHELIAL DETACHMENT OF LEFT EYE: ICD-10-CM

## 2025-07-10 DIAGNOSIS — H35.3132 INTERMEDIATE STAGE NONEXUDATIVE AGE-RELATED MACULAR DEGENERATION OF BOTH EYES: Primary | ICD-10-CM

## 2025-07-10 DIAGNOSIS — E78.00 PURE HYPERCHOLESTEROLEMIA: ICD-10-CM

## 2025-07-10 DIAGNOSIS — H52.13 MYOPIA, BILATERAL: ICD-10-CM

## 2025-07-10 DIAGNOSIS — H43.811 PVD (POSTERIOR VITREOUS DETACHMENT), RIGHT EYE: ICD-10-CM

## 2025-07-10 PROCEDURE — 99213 OFFICE O/P EST LOW 20 MIN: CPT

## 2025-07-10 PROCEDURE — 92134 CPTRZ OPH DX IMG PST SGM RTA: CPT

## 2025-07-10 RX ORDER — ROSUVASTATIN CALCIUM 20 MG/1
20 TABLET, COATED ORAL DAILY
Qty: 30 TABLET | Refills: 1 | Status: SHIPPED | OUTPATIENT
Start: 2025-07-10 | End: 2026-07-10

## 2025-07-10 ASSESSMENT — ENCOUNTER SYMPTOMS
EYES NEGATIVE: 0
ENDOCRINE NEGATIVE: 0
CARDIOVASCULAR NEGATIVE: 0
GASTROINTESTINAL NEGATIVE: 0
PSYCHIATRIC NEGATIVE: 0
MUSCULOSKELETAL NEGATIVE: 0
NEUROLOGICAL NEGATIVE: 0
CONSTITUTIONAL NEGATIVE: 0
RESPIRATORY NEGATIVE: 0
ALLERGIC/IMMUNOLOGIC NEGATIVE: 0
HEMATOLOGIC/LYMPHATIC NEGATIVE: 0

## 2025-07-10 ASSESSMENT — TONOMETRY
OD_IOP_MMHG: 13
IOP_METHOD: GOLDMANN APPLANATION
OS_IOP_MMHG: 15

## 2025-07-10 ASSESSMENT — CUP TO DISC RATIO
OD_RATIO: 0.3
OS_RATIO: 0.3

## 2025-07-10 ASSESSMENT — REFRACTION_WEARINGRX
OS_SPHERE: -1.50
OD_ADD: +2.50
OS_ADD: +2.50
OD_CYLINDER: -2.00
OD_SPHERE: -2.25
OS_CYLINDER: -1.00
OS_AXIS: 023
OD_AXIS: 003

## 2025-07-10 ASSESSMENT — EXTERNAL EXAM - LEFT EYE: OS_EXAM: NORMAL

## 2025-07-10 ASSESSMENT — SLIT LAMP EXAM - LIDS
COMMENTS: GOOD POSITION
COMMENTS: GOOD POSITION

## 2025-07-10 ASSESSMENT — VISUAL ACUITY
OS_CC: 20/20
METHOD: SNELLEN - LINEAR
CORRECTION_TYPE: GLASSES
OD_CC: 20/20

## 2025-07-10 ASSESSMENT — EXTERNAL EXAM - RIGHT EYE: OD_EXAM: NORMAL

## 2025-07-10 NOTE — PROGRESS NOTES
Intermediate stage dry age-related macular degeneration of both eyesH35.3123  Follow up visit for AMD  - evidence of large/ soft drusen OU  - no evidence of CNVM or fluid on exam or OCT  - exam is consistent with intermediate dry    - OCT 07/10/25 : multiple medium to large drusen, no IRF or SRF, both eyes (OU)   - - -   #Plan#:   Slight increase in druse OU compared to last year - Started statins  1. AREDS 2 BID    2. Recommend UV protection, diet modification (eat green leafy vegetables, fish, lower meat consumption)  3. Amsler grid follow up  4. Observe  5. RTC in 6 months     Posterior vitreous detachment (PVD), right eyeH43.811  no retinal breaks, holes or retinal detachment    Plan  Discussed symptoms of retinal breaks and detachment.   Patient educated that retinal detachment can lead to permanent vision loss. Advised to call immediately if the patient experience any of these symptoms.  Observe    Myopia, bilateral  Presbyopia   - managed by Dr. Patel

## 2025-09-03 ENCOUNTER — APPOINTMENT (OUTPATIENT)
Dept: DERMATOLOGY | Facility: CLINIC | Age: 65
End: 2025-09-03
Payer: MEDICARE

## 2025-09-03 ASSESSMENT — PATIENT GLOBAL ASSESSMENT (PGA): PATIENT GLOBAL ASSESSMENT: PATIENT GLOBAL ASSESSMENT:  1 - CLEAR

## 2025-09-03 ASSESSMENT — DERMATOLOGY QUALITY OF LIFE (QOL) ASSESSMENT
DATE THE QUALITY-OF-LIFE ASSESSMENT WAS COMPLETED: 67451
RATE HOW EMOTIONALLY BOTHERED YOU ARE BY YOUR SKIN PROBLEM (FOR EXAMPLE, WORRY, EMBARRASSMENT, FRUSTRATION): 0 - NEVER BOTHERED
RATE HOW BOTHERED YOU ARE BY SYMPTOMS OF YOUR SKIN PROBLEM (EG, ITCHING, STINGING BURNING, HURTING OR SKIN IRRITATION): 0 - NEVER BOTHERED
RATE HOW BOTHERED YOU ARE BY EFFECTS OF YOUR SKIN PROBLEMS ON YOUR ACTIVITIES (EG, GOING OUT, ACCOMPLISHING WHAT YOU WANT, WORK ACTIVITIES OR YOUR RELATIONSHIPS WITH OTHERS): 0 - NEVER BOTHERED
ARE THERE EXCLUSIONS OR EXCEPTIONS FOR THE QUALITY OF LIFE ASSESSMENT: NO

## 2025-09-03 ASSESSMENT — DERMATOLOGY PATIENT ASSESSMENT: DO YOU HAVE ANY NEW OR CHANGING LESIONS: NO

## 2025-09-03 ASSESSMENT — ITCH NUMERIC RATING SCALE: HOW SEVERE IS YOUR ITCHING?: 3

## 2025-09-04 ENCOUNTER — APPOINTMENT (OUTPATIENT)
Dept: DERMATOLOGY | Facility: CLINIC | Age: 65
End: 2025-09-04
Payer: MEDICARE

## 2025-12-12 ENCOUNTER — APPOINTMENT (OUTPATIENT)
Dept: PRIMARY CARE | Facility: CLINIC | Age: 65
End: 2025-12-12
Payer: COMMERCIAL

## 2026-01-08 ENCOUNTER — APPOINTMENT (OUTPATIENT)
Dept: OPHTHALMOLOGY | Facility: CLINIC | Age: 66
End: 2026-01-08
Payer: MEDICARE

## 2026-01-16 ENCOUNTER — APPOINTMENT (OUTPATIENT)
Dept: OPHTHALMOLOGY | Facility: CLINIC | Age: 66
End: 2026-01-16
Payer: MEDICARE